# Patient Record
Sex: FEMALE | Race: WHITE
[De-identification: names, ages, dates, MRNs, and addresses within clinical notes are randomized per-mention and may not be internally consistent; named-entity substitution may affect disease eponyms.]

---

## 2017-01-25 ENCOUNTER — HOSPITAL ENCOUNTER (OUTPATIENT)
Dept: HOSPITAL 47 - RADUSWWP | Age: 57
Discharge: HOME | End: 2017-01-25
Payer: COMMERCIAL

## 2017-01-25 DIAGNOSIS — N20.0: Primary | ICD-10-CM

## 2017-01-25 PROCEDURE — 76770 US EXAM ABDO BACK WALL COMP: CPT

## 2017-01-25 NOTE — US
EXAMINATION TYPE: US kidneys/renal and bladder

 

DATE OF EXAM: 1/25/2017 12:43 PM

 

COMPARISON: CT on PACS

 

CLINICAL HISTORY: R31.9 Hematuria.

 

EXAM MEASUREMENTS:

 

Right Kidney:     10.0 x 5.1 x 3.9 cm

Left Kidney: 10.0 x 5.0 x 4.6 cm

Post Void Residual Volume:  9.0 mL

 

Findings:

Right Kidney: no hydro or masses seen  

Left Kidney: multiple small hyperechoic foci with largest shadowing noted mid pole = 0.3 x 0.3 x 0.4c
m.  

Bladder: wnl

**Bilateral Jets seen: yes

**Normal Post Void Residual: yes

 

 

 

 

IMPRESSION:

Nonobstructing left-sided nephrolithiasis.

## 2017-04-08 ENCOUNTER — HOSPITAL ENCOUNTER (OUTPATIENT)
Dept: HOSPITAL 47 - RADMRIMAIN | Age: 57
Discharge: HOME | End: 2017-04-08
Payer: MEDICARE

## 2017-04-08 DIAGNOSIS — R90.82: Primary | ICD-10-CM

## 2017-04-08 DIAGNOSIS — R42: ICD-10-CM

## 2017-04-08 PROCEDURE — 70551 MRI BRAIN STEM W/O DYE: CPT

## 2017-04-08 NOTE — MR
EXAMINATION TYPE: MR brain wo con

 

DATE OF EXAM: 4/8/2017 2:15 PM

 

COMPARISON: CT 9/4/2014

 

HISTORY: 56-year-old female intermittent lightheadedness

 

TECHNIQUE:  Multiplanar, multisequence images of the brain and brainstem were acquired without IV con
trast. Diffusion weighted imaging is performed. 

 

FINDINGS:  

No evidence for acute infarction, hemorrhage, mass, mass effect, midline shift, herniation, effacemen
t of basal cisterns, or extra-axial fluid collection. 

 

The ventricles and sulci are age-appropriate.

 

Major intracranial flow voids are intact.

 

T2/FLAIR weighted sequences show moderate scattered burden of right white matter change located withi
n the periventricular, subcortical, and deep white matter of both cerebral hemispheres. These number 
approximately 50-60 foci on each side.

 

Midline structures demonstrate normal morphology.  The craniocervical junction is normal. 

 

The visualized sinuses are clear and the globes are intact.

 

 

IMPRESSION:

 

1. No acute intracranial abnormality seen.

2. Moderate scattered burden of T2 bright white matter change. This is nonspecific and probably relat
es to chronic small vessel ischemic disease. Uncontrolled hypertension, vasculitis, and other demyeli
nating processes are also in the differential.

## 2017-06-13 ENCOUNTER — HOSPITAL ENCOUNTER (OUTPATIENT)
Dept: HOSPITAL 47 - SLEEP | Age: 57
End: 2017-06-13
Payer: MEDICARE

## 2017-06-13 DIAGNOSIS — F32.9: ICD-10-CM

## 2017-06-13 DIAGNOSIS — Z79.899: ICD-10-CM

## 2017-06-13 DIAGNOSIS — E03.9: ICD-10-CM

## 2017-06-13 DIAGNOSIS — G62.9: ICD-10-CM

## 2017-06-13 DIAGNOSIS — F41.9: ICD-10-CM

## 2017-06-13 DIAGNOSIS — M79.7: ICD-10-CM

## 2017-06-13 DIAGNOSIS — F51.04: Primary | ICD-10-CM

## 2017-06-13 PROCEDURE — 99211 OFF/OP EST MAY X REQ PHY/QHP: CPT

## 2017-06-13 NOTE — CONS
DATE OF CONSULTATION:  



REASON FOR CONSULTATION: Chronic insomnia. 



57 -year-old female patient suffering from chronic insomnia.  She is 

known to have chronic anxiety/depression/bipolar borderline 

personality along with history of fibromyalgia, peripheral neuropathy 

and chronic pain. The patient is coming in and she is concerned that 

her sleep quality is poor. She is averaging around 5 hours of sleep 

and she would like to typically get around 7 to 8 hours; however, she 

is unable to.  It takes around 50 minutes to fall asleep, and it seems 

that she is waking up in the middle of night and sometimes unable to 

go back to sleep. She also admits to snore and occasionally she stops 

breathing as told by family members. In terms of her chronic insomnia, 

the patient is on the appropriate lifestyle adjustments.  She is not 

drinking coffee. She is not taking any form of caffeinated beverages. 

She has tried Ambien and Lunesta in the past without any benefit. She 

is currently on a combination of clonidine 1 mg at nighttime,  

Trazodone 50 mg at nighttime, Vistaril and melatonin. She is working 

with Dr. Quinones from psychiatry. She is feeling tired and sleepy during 

the day and she has trouble paying attention. No sleepwalking. No 

sleeptalking. No restlessness in the lower extremities. No leg kicks. 

(    ).  



PAST MEDICAL HISTORY:  Anxiety/depression, hypothyroidism, peripheral 

neuropathy, fibromyalgia, chronic back pain, borderline personality 

disorder, chronic insomnia, and hypothyroidism.  



Surgical history is arthroscopy.



ALLERGIES: Not known. 



MEDICATIONS: Include:

1. Klonopin 1 mg at bedtime.

2. Trazodone 30 mg 1 tablet at bedtime.

3. Vistaril 50 mg p.o. daily. 

4. Synthroid 88 mcg p.o. daily. 



SOCIAL HISTORY: Smoker, 1 pack of cigarettes a day. No substance 

abuse.  No alcoholism. No coffee intake. No stimulant intake. No IV 

drugs.  



FAMILY HISTORY: Negative for sleep apnea. 



REVIEW OF SYSTEMS: Twelve-point review of systems was done. Positive 

findings were mentioned above in the history of present illness. She 

looks very cachectic and thin. She takes less than 1000 calories of 

diet on a daily basis.  



BP is 122/55, pulse 60, respirations 14, temperature 98.2, saturation 

95% on room air. Weight is 100, height is 63 inches. Neck size is 11. 

[place] score is 0.  

GENERAL APPEARANCE: Calm, comfortable. 

HEENT: Negative for JVD. There is no goiter, neck mass. 

LUNGS: Clear to auscultation. 

HEART: Sounds are regular rate and rhythm. Normal S1, S2. 

ABDOMEN: Soft, nontender. No organomegaly. 

EXTREMITIES: No edema.  No cyanosis or clubbing. 



IMPRESSIONS:

1. Chronic insomnia. 

2. Suspect obstructive sleep apnea contributing to her insomnia. 

3. Fibromyalgia. 

4. Chronic anxiety and depression. 

5. Borderline personality disorder.  

6. Hypothyroidism. 

7. Peripheral neuropathy. 



PLAN:

1. Continue same medications. 

2. Perform polysomnogram to assess the quality of her sleep, her 

ability to induce and maintain sleep and look for any other causes 

that can interrupt impaired sleep quality.  

3. We will follow and make further recommendations based on results of 

the sleep study.

## 2017-07-21 ENCOUNTER — HOSPITAL ENCOUNTER (OUTPATIENT)
Dept: HOSPITAL 47 - RADXRMAIN | Age: 57
Discharge: HOME | End: 2017-07-21
Payer: MEDICARE

## 2017-07-21 DIAGNOSIS — K59.00: ICD-10-CM

## 2017-07-21 DIAGNOSIS — M25.471: Primary | ICD-10-CM

## 2017-07-21 PROCEDURE — 74022 RADEX COMPL AQT ABD SERIES: CPT

## 2017-07-21 NOTE — XR
EXAMINATION TYPE: XR ankle complete RT

 

DATE OF EXAM: 7/21/2017

 

COMPARISON: NONE

 

HISTORY: Pain and swelling

 

TECHNIQUE: 3 views

 

FINDINGS: Ankle mortise is anatomic. I see no fracture nor dislocation. Joint spaces are fairly sita
l.

 

IMPRESSION: Negative right ankle exam.

## 2017-07-21 NOTE — XR
EXAMINATION TYPE: XR abdomen acute w cxr

 

DATE OF EXAM: 7/21/2017

 

COMPARISON: NONE

 

HISTORY:

 

TECHNIQUE:  Supine, upright, and left side down lateral decubitus views of the abdomen are obtained.

 

FINDINGS:  

 

Heart and mediastinum are normal. Lungs are clear. Bowel gas pattern is normal. There is no sign of i
ntestinal obstruction or pneumoperitoneum. Fecal pattern is normal. There is no evidence of a mass. T
here are clips from cholecystectomy. There are no pathologic calcifications over the kidneys.

IMPRESSION: 

Nonacute abdomen. Normal chest.

## 2017-12-26 ENCOUNTER — HOSPITAL ENCOUNTER (OUTPATIENT)
Dept: HOSPITAL 47 - WWCWWP | Age: 57
Discharge: HOME | End: 2017-12-26
Payer: MEDICARE

## 2017-12-26 DIAGNOSIS — Z53.9: Primary | ICD-10-CM

## 2017-12-26 NOTE — [facility] - HISTORY AND PHYSICAL



DATE OF DICTATION:

12/26/2017



CHIEF COMPLAINT:

The patient is here for her routine gynecologic exam.



HPI:

This is a 57-year-old, G6, P1-0-5-1 with an LMP of 2004.  The patient is without

gynecologic complaints.  She is concerned that she has lost some muscle mass in the

abdomen.  She attributes this to the multiple abortions she had in the past.  She is

otherwise without complaints.  She denies any postmenopausal bleeding.



PAST MEDICAL HISTORY:

Osteoporosis, history of kidney stones, depression, anxiety, fibromyalgia,

hypothyroidism, and neuropathy.



MEDICATIONS:

1. Fosamax 70 mg weekly.

2. Levothyroxine 88 mcg daily.

3. BuSpar 15 mg b.i.d.



ALLERGIES:

No known drug allergies.



PAST SURGICAL:

GYN and family histories are unchanged from the 06/21/2016 H and P.



SOCIAL HISTORY:

She smokes about 12 cigarettes per day and denies alcohol and drug use.  She is single

and has been with her boyfriend since about 2014, but does not live with him and is not

sexually active with him.  She is considered disabled.



REVIEW OF SYSTEMS:

She has lost about 7 pounds over the last year.  She denies respiratory or cardiac

problems GI she has been having some intermittent constipation.



PHYSICAL EXAM:

Blood pressure 138/79, height 5 feet 2 inches,  weight 97 pounds.  BMI 18, temperature

98.2, pulse 69.  This is a well-developed, thin white female who is alert and oriented

x3, in no acute distress.  HEENT is within normal limits.

NECK:  Supple without mass or thyromegaly.  Chest and  LUNGS:  Clear to auscultation.

HEART:  Regular rate and rhythm.  Breasts are without mass or discharge.  Axillary exam

is negative for adenopathy.  Back negative for CVA tenderness.

ABDOMEN:  Soft, nontender, without palpable masses. Pelvic exam external genitalia

reveals an irregular raised lesion of the left vulva on the labia majora measuring

approximately 1.5 x 1.0 cm.  The patient states this has been there a long time, but

would like to have it removed.  There are no other external genitalia lesions.  Cervix

and vagina reveals moderate atrophy without lesions.  There is no evidence of prolapse.

There is no evidence of cystocele and there is minimal bladder and urethral movement

with cough and Valsalva.  The uterus is small and mid to anterior in position and is

nontender.  There are no palpable adnexal masses or tenderness.  Rectovaginal exam is

negative for mass or tenderness and is negative for occult blood extremities nontender.



IMPRESSION:

1. 57-year-old menopausal female with left vulvar lesion, which is slightly irregular,

    measuring approximately 1.5 x 1.0 cm.

2. Otherwise unremarkable gynecologic exam.



PLAN:

1. Pap smear was deferred since she had a normal one last year.

2. Self breast examination was discussed.

3. Mammogram is due and a slip was given the patient for this.

4. Osteoporosis management was discussed.  We have discussed the importance of

    adequate calcium, vitamin D and regular exercise.  She will continue on Fosamax as

    prescribed by her primary care physician.

5. She will return at a later date for removal of the vulvar lesion and this will be

    sent for pathological examination.  She will be scheduled for this appointment.

6. We have discussed the importance of quitting smoking.  We discussed possible

    complications with smoking, including increased risk for COPD, lung cancer,

    worsening of osteoporosis as well as wasting away of muscles.

7. She will return in one year.





MMODL / IJN: [id number] / Job#: [id number]

## 2018-01-03 ENCOUNTER — HOSPITAL ENCOUNTER (OUTPATIENT)
Dept: HOSPITAL 47 - WWCWWP | Age: 58
End: 2018-01-03
Payer: MEDICARE

## 2018-01-03 DIAGNOSIS — A63.0: Primary | ICD-10-CM

## 2018-01-03 PROCEDURE — 88305 TISSUE EXAM BY PATHOLOGIST: CPT

## 2018-01-03 PROCEDURE — 56605 BIOPSY OF VULVA/PERINEUM: CPT

## 2018-01-03 NOTE — P.PCN
Date of Procedure: 01/03/18


Preoperative Diagnosis: 


Left vulvar skin lesion


Postoperative Diagnosis: 


Same


Procedure(s) Performed: 


Excision of left vulvar skin lesion


Anesthesia: local


Surgeon: Leander Galindo


Estimated Blood Loss (ml): 1


Pathology: other (Left vulvar skin lesion)


Condition: stable


Disposition: same day


Indications for Procedure: 


This was a 57 year old female with an LMP of 2004.  The patient was found to 

have a left labia majora skin lesion which she states has grown larger during 

the past year.


Operative Findings: 


The lesion on the left labia majora measured 1.6x1.0 cm. It appeared brown, 

raised and irregular.


Description of Procedure: 


The procedure and possible risks and complications were discussed with the 

patient.  The patient was placed in the lithotomy position. Betadine was used 

to prep the area.  The lesion on the left labia majora measured 1.6x1.0 cm. 

Approximately 3cc of Lidocaine 1% was used for local anesthesia. Following 

determination of adequate anesthesia, the lesion was excised with a scalpel. A 

silver nitrate stick was used to stop small bleeding areas. 3-0 Vicryl suture 

was used to close the defect. Two interrupted sutures were place. The site was 

hemostatic. Antibiotic ointment and dressing were applied. The patient 

tolerated the procedure well. The EBL was 1cc. There were no complications. The 

lesion was sent for pathologic examination.





The patient was instructed to remove the dressing in 24 hours, then apply and 

an antibiotic ointment two times daily. She was instructed to call if problems 

and to return in 2 weeks for a recheck and suture removal.

## 2018-06-11 ENCOUNTER — HOSPITAL ENCOUNTER (OUTPATIENT)
Dept: HOSPITAL 47 - LABWHC1 | Age: 58
Discharge: HOME | End: 2018-06-11
Payer: MEDICARE

## 2018-06-11 DIAGNOSIS — R31.9: ICD-10-CM

## 2018-06-11 DIAGNOSIS — M79.89: Primary | ICD-10-CM

## 2018-06-11 DIAGNOSIS — E03.9: ICD-10-CM

## 2018-06-11 LAB
ALBUMIN SERPL-MCNC: 4.4 G/DL (ref 3.5–5)
ALP SERPL-CCNC: 50 U/L (ref 38–126)
ALT SERPL-CCNC: 28 U/L (ref 9–52)
ANION GAP SERPL CALC-SCNC: 9 MMOL/L
AST SERPL-CCNC: 25 U/L (ref 14–36)
BASOPHILS # BLD AUTO: 0 K/UL (ref 0–0.2)
BASOPHILS NFR BLD AUTO: 0 %
BUN SERPL-SCNC: 12 MG/DL (ref 7–17)
CALCIUM SPEC-MCNC: 9.6 MG/DL (ref 8.4–10.2)
CHLORIDE SERPL-SCNC: 99 MMOL/L (ref 98–107)
CK SERPL-CCNC: 44 U/L (ref 30–135)
CO2 SERPL-SCNC: 33 MMOL/L (ref 22–30)
EOSINOPHIL # BLD AUTO: 0.1 K/UL (ref 0–0.7)
EOSINOPHIL NFR BLD AUTO: 3 %
ERYTHROCYTE [DISTWIDTH] IN BLOOD BY AUTOMATED COUNT: 4.55 M/UL (ref 3.8–5.4)
ERYTHROCYTE [DISTWIDTH] IN BLOOD: 12.7 % (ref 11.5–15.5)
GLUCOSE SERPL-MCNC: 92 MG/DL (ref 74–99)
HCT VFR BLD AUTO: 44 % (ref 34–46)
HGB BLD-MCNC: 14.9 GM/DL (ref 11.4–16)
LYMPHOCYTES # SPEC AUTO: 1.4 K/UL (ref 1–4.8)
LYMPHOCYTES NFR SPEC AUTO: 29 %
MCH RBC QN AUTO: 32.6 PG (ref 25–35)
MCHC RBC AUTO-ENTMCNC: 33.7 G/DL (ref 31–37)
MCV RBC AUTO: 96.7 FL (ref 80–100)
MONOCYTES # BLD AUTO: 0.3 K/UL (ref 0–1)
MONOCYTES NFR BLD AUTO: 6 %
NEUTROPHILS # BLD AUTO: 2.9 K/UL (ref 1.3–7.7)
NEUTROPHILS NFR BLD AUTO: 61 %
PLATELET # BLD AUTO: 192 K/UL (ref 150–450)
POTASSIUM SERPL-SCNC: 4.6 MMOL/L (ref 3.5–5.1)
PROT SERPL-MCNC: 6.8 G/DL (ref 6.3–8.2)
SODIUM SERPL-SCNC: 141 MMOL/L (ref 137–145)
T4 FREE SERPL-MCNC: 1.23 NG/DL (ref 0.78–2.19)
WBC # BLD AUTO: 4.7 K/UL (ref 3.8–10.6)

## 2018-06-11 PROCEDURE — 36415 COLL VENOUS BLD VENIPUNCTURE: CPT

## 2018-06-11 PROCEDURE — 82607 VITAMIN B-12: CPT

## 2018-06-11 PROCEDURE — 82746 ASSAY OF FOLIC ACID SERUM: CPT

## 2018-06-11 PROCEDURE — 84443 ASSAY THYROID STIM HORMONE: CPT

## 2018-06-11 PROCEDURE — 85025 COMPLETE CBC W/AUTO DIFF WBC: CPT

## 2018-06-11 PROCEDURE — 84439 ASSAY OF FREE THYROXINE: CPT

## 2018-06-11 PROCEDURE — 82550 ASSAY OF CK (CPK): CPT

## 2018-06-11 PROCEDURE — 80053 COMPREHEN METABOLIC PANEL: CPT

## 2018-06-11 PROCEDURE — 82306 VITAMIN D 25 HYDROXY: CPT

## 2018-06-12 LAB — VIT B12 SERPL-MCNC: 528 PG/ML (ref 200–944)

## 2018-07-16 ENCOUNTER — HOSPITAL ENCOUNTER (OUTPATIENT)
Dept: HOSPITAL 47 - EC | Age: 58
Setting detail: OBSERVATION
LOS: 1 days | Discharge: HOME | End: 2018-07-17
Attending: HOSPITALIST | Admitting: HOSPITALIST
Payer: MEDICARE

## 2018-07-16 VITALS — BODY MASS INDEX: 17.7 KG/M2

## 2018-07-16 DIAGNOSIS — E03.9: ICD-10-CM

## 2018-07-16 DIAGNOSIS — F31.9: ICD-10-CM

## 2018-07-16 DIAGNOSIS — Z91.5: ICD-10-CM

## 2018-07-16 DIAGNOSIS — F60.9: ICD-10-CM

## 2018-07-16 DIAGNOSIS — Z90.49: ICD-10-CM

## 2018-07-16 DIAGNOSIS — Z79.890: ICD-10-CM

## 2018-07-16 DIAGNOSIS — M62.50: ICD-10-CM

## 2018-07-16 DIAGNOSIS — F41.0: ICD-10-CM

## 2018-07-16 DIAGNOSIS — Z80.1: ICD-10-CM

## 2018-07-16 DIAGNOSIS — J40: ICD-10-CM

## 2018-07-16 DIAGNOSIS — Z79.899: ICD-10-CM

## 2018-07-16 DIAGNOSIS — F17.200: ICD-10-CM

## 2018-07-16 DIAGNOSIS — T48.1X2A: Primary | ICD-10-CM

## 2018-07-16 DIAGNOSIS — M79.7: ICD-10-CM

## 2018-07-16 DIAGNOSIS — F41.9: ICD-10-CM

## 2018-07-16 LAB
ALBUMIN SERPL-MCNC: 3.9 G/DL (ref 3.5–5)
ALP SERPL-CCNC: 47 U/L (ref 38–126)
ALT SERPL-CCNC: 31 U/L (ref 9–52)
ANION GAP SERPL CALC-SCNC: 3 MMOL/L
APAP SPEC-MCNC: <10 UG/ML
AST SERPL-CCNC: 30 U/L (ref 14–36)
BASOPHILS # BLD AUTO: 0 K/UL (ref 0–0.2)
BASOPHILS NFR BLD AUTO: 1 %
BUN SERPL-SCNC: 5 MG/DL (ref 7–17)
CALCIUM SPEC-MCNC: 9.3 MG/DL (ref 8.4–10.2)
CHLORIDE SERPL-SCNC: 106 MMOL/L (ref 98–107)
CO2 SERPL-SCNC: 32 MMOL/L (ref 22–30)
EOSINOPHIL # BLD AUTO: 0.1 K/UL (ref 0–0.7)
EOSINOPHIL NFR BLD AUTO: 3 %
ERYTHROCYTE [DISTWIDTH] IN BLOOD BY AUTOMATED COUNT: 4.36 M/UL (ref 3.8–5.4)
ERYTHROCYTE [DISTWIDTH] IN BLOOD: 12.4 % (ref 11.5–15.5)
GLUCOSE SERPL-MCNC: 87 MG/DL (ref 74–99)
HCT VFR BLD AUTO: 41.2 % (ref 34–46)
HGB BLD-MCNC: 13.9 GM/DL (ref 11.4–16)
LYMPHOCYTES # SPEC AUTO: 1.6 K/UL (ref 1–4.8)
LYMPHOCYTES NFR SPEC AUTO: 45 %
MCH RBC QN AUTO: 32 PG (ref 25–35)
MCHC RBC AUTO-ENTMCNC: 33.8 G/DL (ref 31–37)
MCV RBC AUTO: 94.6 FL (ref 80–100)
MONOCYTES # BLD AUTO: 0.3 K/UL (ref 0–1)
MONOCYTES NFR BLD AUTO: 8 %
NEUTROPHILS # BLD AUTO: 1.5 K/UL (ref 1.3–7.7)
NEUTROPHILS NFR BLD AUTO: 42 %
PH UR: 6 [PH] (ref 5–8)
PLATELET # BLD AUTO: 182 K/UL (ref 150–450)
POTASSIUM SERPL-SCNC: 4.6 MMOL/L (ref 3.5–5.1)
PROT SERPL-MCNC: 6.2 G/DL (ref 6.3–8.2)
RBC UR QL: <1 /HPF (ref 0–5)
SALICYLATES SERPL-MCNC: <1 MG/DL
SODIUM SERPL-SCNC: 141 MMOL/L (ref 137–145)
SP GR UR: 1 (ref 1–1.03)
UROBILINOGEN UR QL STRIP: <2 MG/DL (ref ?–2)
WBC # BLD AUTO: 3.7 K/UL (ref 3.8–10.6)

## 2018-07-16 PROCEDURE — 85025 COMPLETE CBC W/AUTO DIFF WBC: CPT

## 2018-07-16 PROCEDURE — 81001 URINALYSIS AUTO W/SCOPE: CPT

## 2018-07-16 PROCEDURE — 83520 IMMUNOASSAY QUANT NOS NONAB: CPT

## 2018-07-16 PROCEDURE — 80053 COMPREHEN METABOLIC PANEL: CPT

## 2018-07-16 PROCEDURE — 82075 ASSAY OF BREATH ETHANOL: CPT

## 2018-07-16 PROCEDURE — 80306 DRUG TEST PRSMV INSTRMNT: CPT

## 2018-07-16 PROCEDURE — 99285 EMERGENCY DEPT VISIT HI MDM: CPT

## 2018-07-16 RX ADMIN — CEFAZOLIN SCH MLS/HR: 330 INJECTION, POWDER, FOR SOLUTION INTRAMUSCULAR; INTRAVENOUS at 23:34

## 2018-07-16 NOTE — ED
Psych HPI





- General


Source: patient, police, EMS, RN notes reviewed


Mode of arrival: EMS


Limitations: no limitations





<Herber Diop - Last Filed: 07/16/18 18:14>





<Landon Bates - Last Filed: 07/16/18 22:09>





- General


Chief Complaint: Psychiatric Symptoms


Stated Complaint: EPS eval


Time Seen by Provider: 07/16/18 17:09





- History of Present Illness


Initial Comments: 





This a 58-year-old female presents emergency Department with police for 

psychiatric evaluation.  Patient reportedly was threatening to harm herself 

with a knife.  She states that she was a cut her wrist.  She is denying this at 

this time stating that she was just upset that she's had her back weekend.  

Patient reportedly took 3 Flexeril but states that she was not trying to harm 

herself.  Patient also states that she drank a few beers today.  Patient denies 

any homicidal ideation denies any drug use.  Patient does have a history of 

psychiatric disorder is currently being seen at [facility].  She denies chest pain, 

shortness breath, headache, dizziness, nausea or vomiting. (Herber Diop)





- Related Data


 Home Medications











 Medication  Instructions  Recorded  Confirmed


 


DULoxetine HCL [Cymbalta] 60 mg PO BID 07/16/18 07/16/18


 


Levothyroxine Sodium [Synthroid] 100 mcg PO DAILY 07/16/18 07/16/18


 


clonazePAM [KlonoPIN] 1 mg PO HS 07/16/18 07/16/18


 


diphenhydrAMINE [Benadryl] 50 mg PO BID 07/16/18 07/16/18


 


traZODone HCL 50 mg PO HS 07/16/18 07/16/18








 Previous Rx's











 Medication  Instructions  Recorded


 


busPIRone HCl [Buspar] 30 mg PO BID #60 tab 09/15/14


 


traZODone  mg PO HS 5 Days  tab 11/25/16











 Allergies











Allergy/AdvReac Type Severity Reaction Status Date / Time


 


No Known Allergies Allergy   Unverified 07/16/18 17:35














Review of Systems


ROS Other: All systems not noted in ROS Statement are negative.





<Herber Diop - Last Filed: 07/16/18 18:14>


ROS Other: All systems not noted in ROS Statement are negative.





<Landon Bates - Last Filed: 07/16/18 22:09>


ROS Statement: 


Those systems with pertinent positive or pertinent negative responses have been 

documented in the HPI.








Past Medical History


Past Medical History: Fibromyalgia, Thyroid Disorder


Additional Past Medical History / Comment(s): muscle wasting


History of Any Multi-Drug Resistant Organisms: None Reported


Past Surgical History: Orthopedic Surgery


Additional Past Surgical History / Comment(s): Surgey on bilateral knees in 

2006. Torn cartilage. Gallstones surgically removed.  carpal tunnel right hand 

surgery


Past Anesthesia/Blood Transfusion Reactions: No Reported Reaction


Additional Past Anesthesia/Blood Transfusion Reaction / Comment(s): Patient 

denies.


Past Psychological History: Anxiety, Depression, Panic Disorder


Smoking Status: Heavy tobacco smoker


Past Alcohol Use History: Occasional


Past Drug Use History: None Reported





- Past Family History


  ** Mother


Family Medical History: Cancer





  ** Father


Family Medical History: Cancer


Additional Family Medical History / Comment(s): Mother and father passed away 

from lung cancer.





<Herber Diop - Last Filed: 07/16/18 18:14>





General Exam


General appearance: alert, in no apparent distress, anxious


Head exam: Present: atraumatic, normocephalic, normal inspection


Eye exam: Present: normal appearance, PERRL, EOMI.  Absent: scleral icterus, 

conjunctival injection, periorbital swelling


ENT exam: Present: normal exam, normal oropharynx, mucous membranes moist, TM's 

normal bilaterally


Neck exam: Present: normal inspection.  Absent: tenderness, meningismus, 

lymphadenopathy


Respiratory exam: Present: normal lung sounds bilaterally.  Absent: respiratory 

distress, wheezes, rales, rhonchi, stridor


Cardiovascular Exam: Present: regular rate, normal rhythm, normal heart sounds.

  Absent: systolic murmur, diastolic murmur, rubs, gallop, clicks


Neurological exam: Present: alert, oriented X3, CN II-XII intact


Psychiatric exam: Present: agitated, anxious


Skin exam: Present: warm, dry, intact, normal color.  Absent: rash





<Herber Diop - Last Filed: 07/16/18 18:14>





Course





<Herber Diop - Last Filed: 07/16/18 18:14>





<Landon Bates - Last Filed: 07/16/18 22:09>





 Vital Signs











  07/16/18





  17:14


 


Temperature 100.0 F H


 


Pulse Rate 81


 


Respiratory 20





Rate 


 


Blood Pressure 123/70


 


O2 Sat by Pulse 97





Oximetry 














- Reevaluation(s)


Reevaluation #1: 





07/16/18 22:08


Patient was earlier seen by mental health services however was too drowsy to be 

evaluated.  Patient reevaluated by myself, Dr. Bates.  Patient remains drowsy.  

Patient is easily arousable.  Patient does not fully oriented.  No meningismus.

  Dr. Gudino has been paged for hospital admission. (Landon Bates)





- Lab Data





 Lab Results











  07/16/18 Range/Units





  17:16 


 


Urine Color  Colorless  


 


Urine Appearance  Clear  (Clear)  


 


Urine pH  6.0  (5.0-8.0)  


 


Ur Specific Gravity  1.002  (1.001-1.035)  


 


Urine Protein  Negative  (Negative)  


 


Urine Glucose (UA)  Negative  (Negative)  


 


Urine Ketones  Negative  (Negative)  


 


Urine Blood  Small H  (Negative)  


 


Urine Nitrite  Negative  (Negative)  


 


Urine Bilirubin  Negative  (Negative)  


 


Urine Urobilinogen  <2.0  (<2.0)  mg/dL


 


Ur Leukocyte Esterase  Negative  (Negative)  


 


Urine RBC  <1  (0-5)  /hpf


 


Calcium Oxalate Crystal  Rare H  (None)  /hpf


 


Urine Mucus  Rare H  (None)  /hpf


 


Urine Opiates Screen  Not Detected  (NotDetected)  


 


Ur Oxycodone Screen  Not Detected  (NotDetected)  


 


Urine Methadone Screen  Not Detected  (NotDetected)  


 


Ur Propoxyphene Screen  Not Detected  (NotDetected)  


 


Ur Barbiturates Screen  Not Detected  (NotDetected)  


 


U Tricyclic Antidepress  Not Detected  (NotDetected)  


 


Ur Phencyclidine Scrn  Not Detected  (NotDetected)  


 


Ur Amphetamines Screen  Not Detected  (NotDetected)  


 


U Methamphetamines Scrn  Not Detected  (NotDetected)  


 


U Benzodiazepines Scrn  Not Detected  (NotDetected)  


 


Urine Cocaine Screen  Not Detected  (NotDetected)  


 


U Marijuana (THC) Screen  Not Detected  (NotDetected)  














Disposition





<Herber Diop - Last Filed: 07/16/18 18:14>


Is patient prescribed a controlled substance at d/c from ED?: No


Decision Time: 22:09





<Landon Bates - Last Filed: 07/16/18 22:09>


Clinical Impression: 


 Suicide attempt, Drug overdose





Disposition: ADMITTED AS IP TO THIS HOSP


Referrals: 


None,Stated [Primary Care Provider] - 1-2 days

## 2018-07-17 VITALS
RESPIRATION RATE: 16 BRPM | SYSTOLIC BLOOD PRESSURE: 133 MMHG | HEART RATE: 70 BPM | DIASTOLIC BLOOD PRESSURE: 77 MMHG | TEMPERATURE: 97.2 F

## 2018-07-17 RX ADMIN — CEFAZOLIN SCH MLS/HR: 330 INJECTION, POWDER, FOR SOLUTION INTRAMUSCULAR; INTRAVENOUS at 12:54
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
## 2018-07-17 NOTE — P.HPIM
History of Present Illness


58-year-old female came to ER for psychiatric evaluation apparently took about 

4 pills of Flexeril because of his the itch patient was admitted to medicine 

patient the common is metabolic profile essentially within normal limits 

patient is awake and alert.  Patient apparently was threatening to harm herself 

with knife.  But patient denied any suicidal or homicidal ideations to me 

patient is appropriate.  Patient was evaluated by psychiatric related who 

cleared her for discharge patient will be discharged today.  Patient denied any 

suicidal ideations.  Patient evidently had history of bipolar disorder and 

multiple psychiatric hospitalizations patient is not willing to voluntarily get 

admitted to psychiatric floor.  Patient states she took Flexeril to go to sleep 

was not trying to hurt herself or hurt anyone.








Review of Systems


REVIEW OF SYSTEMS: 


CONSTITUTIONAL: No fever, no malaise, no fatigue. 


HEENT: No recent visual problems or hearing problems. Denied any sore throat. 


CARDIOVASCULAR: No chest pain, orthopnea, PND, no palpitations, no syncope. 


PULMONARY: No shortness of breath, no cough, no hemoptysis. 


GASTROINTESTINAL: No diarrhea, no nausea, no vomiting, no abdominal pain. 

Normoactive bowel sounds. 


NEUROLOGICAL: No headaches, no weakness, no numbness. 


HEMATOLOGICAL: Denies any bleeding or petechiae. 


GENITOURINARY: Denies any burning micturition, frequency, or urgency. 


MUSCULOSKELETAL/RHEUMATOLOGICAL: Denies any joint pain, swelling, or any muscle 

pain. 


ENDOCRINE: Denies any polyuria or polydipsia. 





The rest of the 14-point review of systems is negative.











Past Medical History


Past Medical History: Fibromyalgia, Thyroid Disorder


Additional Past Medical History / Comment(s): muscle wasting


History of Any Multi-Drug Resistant Organisms: None Reported


Past Surgical History: Orthopedic Surgery


Additional Past Surgical History / Comment(s): Surgey on bilateral knees in 

2006. Torn cartilage. Gallstones surgically removed.  carpal tunnel right hand 

surgery


Past Anesthesia/Blood Transfusion Reactions: No Reported Reaction


Additional Past Anesthesia/Blood Transfusion Reaction / Comment(s): Patient 

denies.


Past Psychological History: Anxiety, Depression, Panic Disorder


Additional Psychological History / Comment(s): Bordeline Personality Disorder, 

Body Dysmorphia. Patient reports she is open with [facility]. Dr. Mccarthy is her 

psychiatrist and Micaela Ayala is her therapist. Patient has history of 2 [facility] 

addmission. Most recent was at [facility] for suicide attempt.


Smoking Status: Heavy tobacco smoker


Past Alcohol Use History: Occasional


Additional Past Alcohol Use History / Comment(s): pt denies alcohol use.


Past Drug Use History: Marijuana





- Past Family History


  ** Mother


Family Medical History: Cancer





  ** Father


Family Medical History: Cancer


Additional Family Medical History / Comment(s): Mother and father passed away 

from lung cancer.





Medications and Allergies


 Home Medications











 Medication  Instructions  Recorded  Confirmed  Type


 


busPIRone HCl [Buspar] 30 mg PO BID #60 tab 09/15/14 07/16/18 Rx


 


traZODone  mg PO HS 5 Days  tab 11/25/16 07/16/18 Rx


 


DULoxetine HCL [Cymbalta] 60 mg PO BID 07/16/18 07/16/18 History


 


Levothyroxine Sodium [Synthroid] 100 mcg PO DAILY 07/16/18 07/16/18 History


 


clonazePAM [KlonoPIN] 1 mg PO HS 07/16/18 07/16/18 History


 


diphenhydrAMINE [Benadryl] 50 mg PO BID 07/16/18 07/16/18 History


 


traZODone HCL 50 mg PO HS 07/16/18 07/16/18 History











 Allergies











Allergy/AdvReac Type Severity Reaction Status Date / Time


 


No Known Allergies Allergy   Unverified 07/16/18 17:35














Physical Exam


Vitals: 


 Vital Signs











  Temp Pulse Pulse Resp BP BP Pulse Ox


 


 07/17/18 16:00     16   


 


 07/17/18 14:30  97.2 F L   70  16   133/77  93 L


 


 07/17/18 12:34    58 L  24   


 


 07/17/18 08:00     18   


 


 07/17/18 05:22  97.3 F L   60  19   133/70  93 L


 


 07/16/18 23:49  97.0 F L   62  17   126/81  99


 


 07/16/18 22:00  97.9 F  57 L   16  126/71   96


 


 07/16/18 17:14  100.0 F H  81   20  123/70   97








 Intake and Output











 07/17/18 07/17/18 07/17/18





 06:59 14:59 22:59


 


Intake Total  600 


 


Balance  600 


 


Intake:   


 


  Oral  600 


 


Other:   


 


  # Voids 1 3 


 


  Weight 43.99 kg 43.99 kg 











PHYSICAL EXAMINATION: 





GENERAL: The patient is alert and oriented x3, not in any acute distress. Well 

developed, well nourished. 


HEENT: Pupils are round and equally reacting to light. EOMI. No scleral 

icterus. No conjunctival pallor. Normocephalic, atraumatic. No pharyngeal 

erythema. No thyromegaly. 


CARDIOVASCULAR: S1 and S2 present. No murmurs, rubs, or gallops. 


PULMONARY: Chest is clear to auscultation, no wheezing or crackles. 


ABDOMEN: Soft, nontender, nondistended, normoactive bowel sounds. No palpable 

organomegaly. 


MUSCULOSKELETAL: No joint swelling or deformity.


EXTREMITIES: No cyanosis, clubbing, or pedal edema.  Patient does have couple 

scratches in the left forearm which appeared to be from knife


NEUROLOGICAL: Gross neurological examination did not reveal any focal deficits. 


SKIN: No rashes. 








Results


CBC & Chem 7: 


 07/16/18 23:11





 07/16/18 23:11


Labs: 


 Abnormal Lab Results - Last 24 Hours (Table)











  07/16/18 07/16/18 07/16/18 Range/Units





  17:16 23:11 23:11 


 


WBC    3.7 L  (3.8-10.6)  k/uL


 


Carbon Dioxide   32 H   (22-30)  mmol/L


 


BUN   5 L   (7-17)  mg/dL


 


Total Protein   6.2 L   (6.3-8.2)  g/dL


 


Urine Blood  Small H    (Negative)  


 


Calcium Oxalate Crystal  Rare H    (None)  /hpf


 


Urine Mucus  Rare H    (None)  /hpf














Thrombosis Risk Factor Assmnt





- Choose All That Apply


Any of the Below Risk Factors Present?: Yes


Each Factor Represents 1 point: Age 41-60 years


Other Risk Factors: No


Other congenital or acquired thrombophilia - If yes, enter type in comment: No


Thrombosis Risk Factor Assessment Total Risk Factor Score: 1


Thrombosis Risk Factor Assessment Level: Low Risk





Assessment and Plan


Plan: 


Overdose on Flexeril: Patient is clinically stable at this point of time where 

he'll wake patient is medically stable to be discharged patient will be 

discharged if cleared by psychiatric


-Possibly of suicidal ideations although she denied any suicide attempt to me.  

Patient of discharge depending on psychiatric evaluation.


-Hypothyroidism


-Fibromyalgia.


-Continued smoking with some mild tracheobronchitis and possibility of COPD 

without any significant exacerbation, counseling regarding smoking was provided 

and do not believe patient will require antibiotics.

## 2018-08-10 ENCOUNTER — HOSPITAL ENCOUNTER (EMERGENCY)
Dept: HOSPITAL 47 - EC | Age: 58
Discharge: HOME | End: 2018-08-10
Payer: MEDICARE

## 2018-08-10 VITALS — DIASTOLIC BLOOD PRESSURE: 51 MMHG | SYSTOLIC BLOOD PRESSURE: 117 MMHG | TEMPERATURE: 98.5 F | HEART RATE: 58 BPM

## 2018-08-10 VITALS — RESPIRATION RATE: 18 BRPM

## 2018-08-10 DIAGNOSIS — F10.129: ICD-10-CM

## 2018-08-10 DIAGNOSIS — F32.9: ICD-10-CM

## 2018-08-10 DIAGNOSIS — T42.4X1A: Primary | ICD-10-CM

## 2018-08-10 DIAGNOSIS — F17.200: ICD-10-CM

## 2018-08-10 DIAGNOSIS — Z79.899: ICD-10-CM

## 2018-08-10 DIAGNOSIS — E07.9: ICD-10-CM

## 2018-08-10 DIAGNOSIS — F41.0: ICD-10-CM

## 2018-08-10 LAB
ALBUMIN SERPL-MCNC: 4.4 G/DL (ref 3.5–5)
ALP SERPL-CCNC: 47 U/L (ref 38–126)
ALT SERPL-CCNC: 21 U/L (ref 9–52)
ANION GAP SERPL CALC-SCNC: 13 MMOL/L
APAP SPEC-MCNC: <10 UG/ML
AST SERPL-CCNC: 25 U/L (ref 14–36)
BASOPHILS # BLD AUTO: 0 K/UL (ref 0–0.2)
BASOPHILS NFR BLD AUTO: 0 %
BUN SERPL-SCNC: 11 MG/DL (ref 7–17)
CALCIUM SPEC-MCNC: 9.6 MG/DL (ref 8.4–10.2)
CHLORIDE SERPL-SCNC: 104 MMOL/L (ref 98–107)
CO2 SERPL-SCNC: 23 MMOL/L (ref 22–30)
EOSINOPHIL # BLD AUTO: 0.1 K/UL (ref 0–0.7)
EOSINOPHIL NFR BLD AUTO: 1 %
ERYTHROCYTE [DISTWIDTH] IN BLOOD BY AUTOMATED COUNT: 4.43 M/UL (ref 3.8–5.4)
ERYTHROCYTE [DISTWIDTH] IN BLOOD: 12.6 % (ref 11.5–15.5)
GLUCOSE SERPL-MCNC: 84 MG/DL (ref 74–99)
HCT VFR BLD AUTO: 42 % (ref 34–46)
HGB BLD-MCNC: 13.9 GM/DL (ref 11.4–16)
LYMPHOCYTES # SPEC AUTO: 2.2 K/UL (ref 1–4.8)
LYMPHOCYTES NFR SPEC AUTO: 37 %
MCH RBC QN AUTO: 31.4 PG (ref 25–35)
MCHC RBC AUTO-ENTMCNC: 33.1 G/DL (ref 31–37)
MCV RBC AUTO: 95 FL (ref 80–100)
MONOCYTES # BLD AUTO: 0.3 K/UL (ref 0–1)
MONOCYTES NFR BLD AUTO: 6 %
NEUTROPHILS # BLD AUTO: 3.4 K/UL (ref 1.3–7.7)
NEUTROPHILS NFR BLD AUTO: 55 %
PLATELET # BLD AUTO: 232 K/UL (ref 150–450)
POTASSIUM SERPL-SCNC: 3.7 MMOL/L (ref 3.5–5.1)
PROT SERPL-MCNC: 6.8 G/DL (ref 6.3–8.2)
SALICYLATES SERPL-MCNC: <1 MG/DL
SODIUM SERPL-SCNC: 140 MMOL/L (ref 137–145)
WBC # BLD AUTO: 6.1 K/UL (ref 3.8–10.6)

## 2018-08-10 PROCEDURE — 82075 ASSAY OF BREATH ETHANOL: CPT

## 2018-08-10 PROCEDURE — 81025 URINE PREGNANCY TEST: CPT

## 2018-08-10 PROCEDURE — 96360 HYDRATION IV INFUSION INIT: CPT

## 2018-08-10 PROCEDURE — 85025 COMPLETE CBC W/AUTO DIFF WBC: CPT

## 2018-08-10 PROCEDURE — 80320 DRUG SCREEN QUANTALCOHOLS: CPT

## 2018-08-10 PROCEDURE — 99285 EMERGENCY DEPT VISIT HI MDM: CPT

## 2018-08-10 PROCEDURE — 80053 COMPREHEN METABOLIC PANEL: CPT

## 2018-08-10 PROCEDURE — 83520 IMMUNOASSAY QUANT NOS NONAB: CPT

## 2018-08-10 PROCEDURE — 93005 ELECTROCARDIOGRAM TRACING: CPT

## 2018-08-10 PROCEDURE — 36415 COLL VENOUS BLD VENIPUNCTURE: CPT

## 2018-08-10 PROCEDURE — 80306 DRUG TEST PRSMV INSTRMNT: CPT

## 2018-08-10 NOTE — ED
General Adult HPI





- General


Source: patient, EMS, RN notes reviewed


Mode of arrival: EMS


Limitations: no limitations





<Ji Wilkinson - Last Filed: 08/10/18 16:51>





<Femi Morgan - Last Filed: 08/10/18 19:21>





- General


Chief complaint: Psychiatric Symptoms


Stated complaint: Overdose


Time Seen by Provider: 08/10/18 13:15





- History of Present Illness


Initial comments: 





This is a 58-year-old who presents to the emergency department after having 

overdosed on 8 BuSpar and 6 Klonopin as well as drink alcohol.  Patient states 

she suicidal and she took the pills try to kill himself.  Patient denies any 

other legal or illegal drugs.  Patient was not very cooperative would not give 

me any history at this time other than this a physically she has no problems. (

Ji Wilkinson)





- Related Data


 Home Medications











 Medication  Instructions  Recorded  Confirmed


 


Levothyroxine Sodium [Synthroid] 100 mcg PO DAILY 07/16/18 08/10/18


 


clonazePAM [KlonoPIN] 1 mg PO HS 07/16/18 08/10/18


 


diphenhydrAMINE [Benadryl] 50 mg PO BID 07/16/18 08/10/18


 


traZODone HCL 50 mg PO HS 07/16/18 08/10/18


 


FLUoxetine HCL [PROzac] 20 mg PO DAILY 08/10/18 08/10/18








 Previous Rx's











 Medication  Instructions  Recorded


 


busPIRone HCl [Buspar] 30 mg PO BID #60 tab 09/15/14


 


traZODone  mg PO HS 5 Days  tab 11/25/16











 Allergies











Allergy/AdvReac Type Severity Reaction Status Date / Time


 


No Known Allergies Allergy   Verified 08/10/18 13:22














Review of Systems


ROS Other: All systems not noted in ROS Statement are negative.





<Ji Wilkinson - Last Filed: 08/10/18 16:51>


ROS Other: All systems not noted in ROS Statement are negative.





<Femi Morgan - Last Filed: 08/10/18 19:21>


ROS Statement: 


Those systems with pertinent positive or pertinent negative responses have been 

documented in the HPI.








Past Medical History


Past Medical History: Fibromyalgia, Thyroid Disorder


Additional Past Medical History / Comment(s): muscle wasting


History of Any Multi-Drug Resistant Organisms: None Reported


Past Surgical History: Orthopedic Surgery


Additional Past Surgical History / Comment(s): Surgey on bilateral knees in 

2006. Torn cartilage. Gallstones surgically removed.  carpal tunnel right hand 

surgery


Past Anesthesia/Blood Transfusion Reactions: No Reported Reaction


Additional Past Anesthesia/Blood Transfusion Reaction / Comment(s): Patient 

denies.


Past Psychological History: Anxiety, Depression, Panic Disorder


Smoking Status: Heavy tobacco smoker


Past Alcohol Use History: Occasional


Past Drug Use History: Marijuana





- Past Family History


  ** Mother


Family Medical History: Cancer





  ** Father


Family Medical History: Cancer


Additional Family Medical History / Comment(s): Mother and father passed away 

from lung cancer.





<Ji Wilkinson - Last Filed: 08/10/18 16:51>





General Exam


Limitations: no limitations





<Ji Wilkinson - Last Filed: 08/10/18 16:51>





<Femi Morgan - Last Filed: 08/10/18 19:21>





- General Exam Comments


Initial Comments: 





GENERAL:


Patient is well-developed and well-nourished.  Patient is nontoxic and well-

hydrated and is in no acute distress.  Patient appears intoxicated





ENT:


Neck is soft and supple.  No significant lymphadenopathy is noted.  Oropharynx 

is clear.  Moist mucous membranes.  Neck has full range of motion without 

eliciting any pain.  





EYES:


The sclera were anicteric and conjunctiva were pink and moist.  Extraocular 

movements were intact and pupils were equal round and reactive to light.  

Eyelids were unremarkable.





PULMONARY:


Unlabored respirations.  Good breath sounds bilaterally.  No audible rales 

rhonchi or wheezing was noted.





CARDIOVASCULAR:


There is a regular rate and rhythm without any murmurs gallops or rubs.  





ABDOMEN:


Soft and nontender with normal bowel sounds.  





SKIN:


Skin is clear with no lesions or rashes and otherwise unremarkable.





NEUROLOGIC:


Patient is alert and oriented x3.  Cranial nerves II through XII are grossly 

intact.  Motor and sensory are also intact.  Normal speech, volume and content.

  Symmetrical smile.  





MUSCULOSKELETAL:


Normal extremities with adequate strength and full range of motion.  





LYMPHATICS:


No significant lymphadenopathy is noted





PSYCHIATRIC:


Patient states she suicidal and she is very agitated and aggressive. (Ji Wilkinson)





 Vital Signs











  08/10/18 08/10/18 08/10/18





  13:16 13:43 16:52


 


Temperature 98 F  


 


Pulse Rate 79 77 66


 


Respiratory 18 18 18





Rate   


 


Blood Pressure 122/61 107/55 122/58


 


O2 Sat by Pulse 95 96 98





Oximetry   














Procedures





- Restraint - Face to Face


  ** Restraint Occurrence 1


Patient's Immediate Situation: Endangers others' safety


Patient's Reaction to the Intervention: Uncooperative, Belligerent


Patient's Medical & Behavioral Condition: Awake, Alert


Need to Continue or Terminate Restraint or Seclusion: Continue


Face to Face Eval of Restraint Date: 08/10/18


Face to Face Eval of Restraint Time: 13:34





<Ji Wilkinson - Last Filed: 08/10/18 16:51>





Medical Decision Making





- Lab Data


Result diagrams: 


 08/10/18 13:26





 08/10/18 13:26





<Ji Wilkinson - Last Filed: 08/10/18 16:51>





- Lab Data


Result diagrams: 


 08/10/18 13:26





 08/10/18 13:26





<Femi Morgan - Last Filed: 08/10/18 19:21>





- Medical Decision Making





EKG shows normal sinus rhythm at 82 bpm CA interval 268 QRSs 84 Q-T intervals 

398 QTC is 464.  Patient's EKG shows no ST segment elevation or depression or T 

wave abnormalities are noted.





Dr. Morgan will be taking over the care of this patient at 5 PM (Ji Wilkinson)





Patient's care is signed out at shift change awaiting sobriety and EPS 

evaluation.  Patient is sober on reevaluation.  She is cleared medically.  She 

is alert and oriented, denies suicidal or homicidal ideation.  She is evaluated 

by EPS, no need for inpatient treatment at this time.  She will be discharged 

home with outpatient [facility] follow-up. (Femi Morgan)





- Lab Data





 Lab Results











  08/10/18 08/10/18 08/10/18 Range/Units





  13:26 13:26 14:06 


 


WBC   6.1   (3.8-10.6)  k/uL


 


RBC   4.43   (3.80-5.40)  m/uL


 


Hgb   13.9   (11.4-16.0)  gm/dL


 


Hct   42.0   (34.0-46.0)  %


 


MCV   95.0   (80.0-100.0)  fL


 


MCH   31.4   (25.0-35.0)  pg


 


MCHC   33.1   (31.0-37.0)  g/dL


 


RDW   12.6   (11.5-15.5)  %


 


Plt Count   232   (150-450)  k/uL


 


Neutrophils %   55   %


 


Lymphocytes %   37   %


 


Monocytes %   6   %


 


Eosinophils %   1   %


 


Basophils %   0   %


 


Neutrophils #   3.4   (1.3-7.7)  k/uL


 


Lymphocytes #   2.2   (1.0-4.8)  k/uL


 


Monocytes #   0.3   (0-1.0)  k/uL


 


Eosinophils #   0.1   (0-0.7)  k/uL


 


Basophils #   0.0   (0-0.2)  k/uL


 


Sodium  140    (137-145)  mmol/L


 


Potassium  3.7    (3.5-5.1)  mmol/L


 


Chloride  104    ()  mmol/L


 


Carbon Dioxide  23    (22-30)  mmol/L


 


Anion Gap  13    mmol/L


 


BUN  11    (7-17)  mg/dL


 


Creatinine  0.69    (0.52-1.04)  mg/dL


 


Est GFR (CKD-EPI)AfAm  >90    (>60 ml/min/1.73 sqM)  


 


Est GFR (CKD-EPI)NonAf  >90    (>60 ml/min/1.73 sqM)  


 


Glucose  84    (74-99)  mg/dL


 


Calcium  9.6    (8.4-10.2)  mg/dL


 


Total Bilirubin  0.3    (0.2-1.3)  mg/dL


 


AST  25    (14-36)  U/L


 


ALT  21    (9-52)  U/L


 


Alkaline Phosphatase  47    ()  U/L


 


Total Protein  6.8    (6.3-8.2)  g/dL


 


Albumin  4.4    (3.5-5.0)  g/dL


 


Urine HCG, Qual     (Not Detectd)  


 


Salicylates  <1.0    mg/dL


 


Urine Opiates Screen    Not Detected  (NotDetected)  


 


Ur Oxycodone Screen    Not Detected  (NotDetected)  


 


Urine Methadone Screen    Not Detected  (NotDetected)  


 


Ur Propoxyphene Screen    Not Detected  (NotDetected)  


 


Acetaminophen  <10.0    ug/mL


 


Ur Barbiturates Screen    Not Detected  (NotDetected)  


 


U Tricyclic Antidepress    Not Detected  (NotDetected)  


 


Ur Phencyclidine Scrn    Not Detected  (NotDetected)  


 


Ur Amphetamines Screen    Not Detected  (NotDetected)  


 


U Methamphetamines Scrn    Not Detected  (NotDetected)  


 


U Benzodiazepines Scrn    Not Detected  (NotDetected)  


 


Urine Cocaine Screen    Not Detected  (NotDetected)  


 


U Marijuana (THC) Screen    Detected H  (NotDetected)  


 


Serum Alcohol  99    mg/dL














  08/10/18 Range/Units





  14:06 


 


WBC   (3.8-10.6)  k/uL


 


RBC   (3.80-5.40)  m/uL


 


Hgb   (11.4-16.0)  gm/dL


 


Hct   (34.0-46.0)  %


 


MCV   (80.0-100.0)  fL


 


MCH   (25.0-35.0)  pg


 


MCHC   (31.0-37.0)  g/dL


 


RDW   (11.5-15.5)  %


 


Plt Count   (150-450)  k/uL


 


Neutrophils %   %


 


Lymphocytes %   %


 


Monocytes %   %


 


Eosinophils %   %


 


Basophils %   %


 


Neutrophils #   (1.3-7.7)  k/uL


 


Lymphocytes #   (1.0-4.8)  k/uL


 


Monocytes #   (0-1.0)  k/uL


 


Eosinophils #   (0-0.7)  k/uL


 


Basophils #   (0-0.2)  k/uL


 


Sodium   (137-145)  mmol/L


 


Potassium   (3.5-5.1)  mmol/L


 


Chloride   ()  mmol/L


 


Carbon Dioxide   (22-30)  mmol/L


 


Anion Gap   mmol/L


 


BUN   (7-17)  mg/dL


 


Creatinine   (0.52-1.04)  mg/dL


 


Est GFR (CKD-EPI)AfAm   (>60 ml/min/1.73 sqM)  


 


Est GFR (CKD-EPI)NonAf   (>60 ml/min/1.73 sqM)  


 


Glucose   (74-99)  mg/dL


 


Calcium   (8.4-10.2)  mg/dL


 


Total Bilirubin   (0.2-1.3)  mg/dL


 


AST   (14-36)  U/L


 


ALT   (9-52)  U/L


 


Alkaline Phosphatase   ()  U/L


 


Total Protein   (6.3-8.2)  g/dL


 


Albumin   (3.5-5.0)  g/dL


 


Urine HCG, Qual  Not Detected  (Not Detectd)  


 


Salicylates   mg/dL


 


Urine Opiates Screen   (NotDetected)  


 


Ur Oxycodone Screen   (NotDetected)  


 


Urine Methadone Screen   (NotDetected)  


 


Ur Propoxyphene Screen   (NotDetected)  


 


Acetaminophen   ug/mL


 


Ur Barbiturates Screen   (NotDetected)  


 


U Tricyclic Antidepress   (NotDetected)  


 


Ur Phencyclidine Scrn   (NotDetected)  


 


Ur Amphetamines Screen   (NotDetected)  


 


U Methamphetamines Scrn   (NotDetected)  


 


U Benzodiazepines Scrn   (NotDetected)  


 


Urine Cocaine Screen   (NotDetected)  


 


U Marijuana (THC) Screen   (NotDetected)  


 


Serum Alcohol   mg/dL














Disposition





<Ji Wilkinson - Last Filed: 08/10/18 16:51>


Is patient prescribed a controlled substance at d/c from ED?: No


Time of Disposition: 19:20





<Femi Morgan - Last Filed: 08/10/18 19:21>


Clinical Impression: 


 Drug overdose, Depression, Alcohol abuse





Disposition: HOME SELF-CARE


Condition: Fair


Instructions:  Abuse of Alcohol (ED), Depression (ED)


Additional Instructions: 


Please follow-up with [facility] mental [facility] and her primary care physician.  

Return with worsening or changing symptoms.


Referrals: 


None,Stated [REFERRING] - 1-2 days

## 2018-08-21 ENCOUNTER — HOSPITAL ENCOUNTER (OUTPATIENT)
Dept: HOSPITAL 47 - RADCTMAIN | Age: 58
Discharge: HOME | End: 2018-08-21
Attending: UROLOGY
Payer: MEDICARE

## 2018-08-21 DIAGNOSIS — N20.0: Primary | ICD-10-CM

## 2018-08-21 PROCEDURE — 74400 UROGRAPHY IV +-KUB TOMOG: CPT

## 2018-08-21 PROCEDURE — 36415 COLL VENOUS BLD VENIPUNCTURE: CPT

## 2018-08-21 PROCEDURE — 74178 CT ABD&PLV WO CNTR FLWD CNTR: CPT

## 2018-08-21 NOTE — CT
EXAMINATION TYPE: CT urogram wo/w con

 

DATE OF EXAM: 8/21/2018

 

HISTORY: Hematuria

 

CT DLP: 896mGycm  Automated Exposure Control for Dose Reduction was Utilized.

 

CONTRAST: 

CT scan of the abdomen and pelvis is performed without oral and without and with IV Contrast, patient
 injected with 100 ml mL of Isovue 300. Urogram protocol with Three-D reconstructed images created on
 independent workstation and reviewed.

 

COMPARISON: Prior CT abdomen and pelvis study February 13, 2016.

 

FINDINGS:

 

KUB: Noncontrast images show single 3 mm calculus centrally mid pole level left kidney coronal image 
61 on noncontrast study slightly larger in size versus prior study. There is no right-sided nephrolit
hiasis. There is symmetric cortical medullary uptake and excretion from both kidneys without evidence
 of concerning solid or cystic renal mass or hydronephrosis identified bilaterally. Visualized portio
n of both ureters show satisfactory opacity without suspicious dilatation or calcification. Bladder i
s satisfactorily distended without worrisome intraluminal mass or wall thickening.

 

LUNG BASES: There is minimal central left basilar linear scarring redemonstrated.

 

LIVER/GB: Cholecystectomy clips are redemonstrated. Liver is felt mildly enlarged particularly left h
epatic lobe without significant change from prior study.

 

PANCREAS: No significant abnormality is seen.

 

SPLEEN: No significant abnormality is seen.

 

ADRENALS: No significant abnormality is seen.

 

KIDNEYS: No significant abnormality is seen.

 

BOWEL: Patient has very little intra-abdominal fat making evaluation of bowel is suboptimal. Evaluati
on also suboptimal due to lack of enteric contrast. No suspicious dilatation is present. There are di
verticula seen in the somewhat redundant sigmoid colon of the pelvis. No convincing evidence for acut
e diverticulitis.

 

UTERUS/ADNEXA: Anteverted uterus is noted.

 

LYMPH NODES: No greater than 1cm abdominal or pelvic lymph nodes are appreciated.

 

OSSEOUS STRUCTURES: Mild multilevel anterior spurring in the visualized spine facet arthropathy lower
 lumbar levels is present.

 

OTHER: There is mild to moderate calcified plaque of aorta extending into branch vessels.

 

IMPRESSION: There is slightly larger nonobstructing 3 mm calculus mid pole level left kidney. No worr
isome mass identified.

## 2018-09-07 ENCOUNTER — HOSPITAL ENCOUNTER (OUTPATIENT)
Dept: HOSPITAL 47 - RADMRIMAIN | Age: 58
Discharge: HOME | End: 2018-09-07
Attending: PSYCHIATRY & NEUROLOGY
Payer: MEDICARE

## 2018-09-07 DIAGNOSIS — R90.89: Primary | ICD-10-CM

## 2018-09-07 DIAGNOSIS — R25.1: ICD-10-CM

## 2018-09-07 PROCEDURE — 70551 MRI BRAIN STEM W/O DYE: CPT

## 2018-09-07 NOTE — MR
EXAMINATION TYPE: MR brain wo con

 

DATE OF EXAM: 9/7/2018

 

COMPARISON: Prior MRI brain April 8, 2017

 

HISTORY: Tremors of nervous system per order. Possible stroke with dizziness or hearing loss per rubens
ent.

 

TECHNIQUE: Multiplanar, multisequence imaging of the brain and brainstem is performed without IV cont
rast.

 

FINDINGS:  

Diffusion weighted images demonstrate no evidence of a recent infarct or other diffusion abnormality.


 

There is no worrisome extra-axial fluid collection.  The ventricular system and cisternal spaces are 
normal in size and appearance.  The brain volume is age appropriate. There is redemonstration of scat
tered foci T2 hyperintensity seen throughout the superficial, deep, and periventricular white matter.
 Roughly 50-80 scattered lesions are redemonstrated without significant interval change from prior st
udy. No definitive infratentorial lesions are seen.

 

Midline structures demonstrate normal morphology.  The craniocervical junction appears within normal 
limits. Normal vascular flow voids are present. The visualized sinuses are clear and the globes are i
ntact.

 

IMPRESSION: Moderate to severe nonspecific white matter changes redemonstrated without significant in
terval change. Findings could be product of chronic small vessel ischemic change. Other etiologies ar
e not excluded.

## 2018-10-25 ENCOUNTER — HOSPITAL ENCOUNTER (OUTPATIENT)
Dept: HOSPITAL 47 - RADUSWWP | Age: 58
Discharge: HOME | End: 2018-10-25
Attending: PSYCHIATRY & NEUROLOGY
Payer: MEDICARE

## 2018-10-25 ENCOUNTER — HOSPITAL ENCOUNTER (EMERGENCY)
Dept: HOSPITAL 47 - EC | Age: 58
LOS: 1 days | Discharge: HOME | End: 2018-10-26
Payer: MEDICARE

## 2018-10-25 DIAGNOSIS — F17.200: ICD-10-CM

## 2018-10-25 DIAGNOSIS — F41.9: Primary | ICD-10-CM

## 2018-10-25 DIAGNOSIS — R25.1: Primary | ICD-10-CM

## 2018-10-25 DIAGNOSIS — E07.9: ICD-10-CM

## 2018-10-25 DIAGNOSIS — Z79.899: ICD-10-CM

## 2018-10-25 DIAGNOSIS — M79.7: ICD-10-CM

## 2018-10-25 DIAGNOSIS — R42: ICD-10-CM

## 2018-10-25 DIAGNOSIS — G47.00: ICD-10-CM

## 2018-10-25 DIAGNOSIS — F32.9: ICD-10-CM

## 2018-10-25 PROCEDURE — 99283 EMERGENCY DEPT VISIT LOW MDM: CPT

## 2018-10-25 PROCEDURE — 93880 EXTRACRANIAL BILAT STUDY: CPT

## 2018-10-25 PROCEDURE — 82075 ASSAY OF BREATH ETHANOL: CPT

## 2018-10-25 PROCEDURE — 80306 DRUG TEST PRSMV INSTRMNT: CPT

## 2018-10-25 NOTE — ED
Psych HPI





- General


Chief Complaint: Psychiatric Symptoms


Stated Complaint: panic attack


Time Seen by Provider: 10/25/18 22:04


Source: patient


Mode of arrival: ambulatory





- History of Present Illness


Initial Comments: 





This patient is a 58-year-old woman presenting with concerns about anxiety and 

insomnia.  The patient states that she had recently been changed from Klonopin, 

1 mg, at bedtime to taking clonidine.  She states that since that time she is 

having difficulty getting to sleep.  She feels very anxious, and she also feels 

somewhat shaky.  The patient states that she attempted to reach out to the 

crisis line and also to [facility], and she was referred to the 

emergency department to be seen.  The patient is concerned that if she is left 

alone tonight she doesn't know what she will do.


MD Complaint: other


-: days(s)


Associated Psychiatric Symptoms: racing thoughts


History of same: Yes


Quality: getting worse


Improves With: none


Worsens With: none


Context: new medication(s)


Associated Symptoms: insomnia





- Related Data


 Home Medications











 Medication  Instructions  Recorded  Confirmed


 


diphenhydrAMINE [Benadryl] 50 mg PO BID 07/16/18 10/25/18


 


FLUoxetine HCL [PROzac] 20 mg PO DAILY 08/10/18 10/25/18


 


Levothyroxine Sodium [Synthroid] 88 mcg PO DAILY 10/25/18 10/25/18


 


cloNIDine HCL [Catapres] 0.2 mg PO BID 10/25/18 10/25/18


 


clonazePAM [KlonoPIN] 0.5 mg PO HS 10/25/18 10/25/18


 


traZODone  mg PO HS 10/25/18 10/25/18








 Previous Rx's











 Medication  Instructions  Recorded


 


busPIRone HCl [Buspar] 30 mg PO BID #60 tab 09/15/14











 Allergies











Allergy/AdvReac Type Severity Reaction Status Date / Time


 


No Known Allergies Allergy   Verified 10/25/18 21:50














Review of Systems


ROS Statement: 


Those systems with pertinent positive or pertinent negative responses have been 

documented in the HPI.





ROS Other: All systems not noted in ROS Statement are negative.


Constitutional: Denies: fever, chills


Eyes: Denies: vision change


Respiratory: Denies: cough, dyspnea


Cardiovascular: Denies: chest pain, palpitations, edema


Gastrointestinal: Denies: abdominal pain, vomiting, diarrhea


Neurological: Denies: headache, weakness, numbness, paresthesias


Psychiatric: Reports: anxiety.  Denies: depression, auditory hallucinations, 

visual hallucinations, homicidal thoughts, suicidal thoughts





Past Medical History


Past Medical History: Fibromyalgia, Thyroid Disorder


Additional Past Medical History / Comment(s): muscle wasting


History of Any Multi-Drug Resistant Organisms: None Reported


Past Surgical History: Orthopedic Surgery


Additional Past Surgical History / Comment(s): Surgey on bilateral knees in 

2006. Torn cartilage. Gallstones surgically removed.  carpal tunnel right hand 

surgery


Past Anesthesia/Blood Transfusion Reactions: No Reported Reaction


Additional Past Anesthesia/Blood Transfusion Reaction / Comment(s): Patient 

denies.


Past Psychological History: Anxiety, Depression, Panic Disorder


Smoking Status: Heavy tobacco smoker


Past Alcohol Use History: Occasional


Past Drug Use History: Marijuana





- Past Family History


  ** Mother


Family Medical History: Cancer





  ** Father


Family Medical History: Cancer


Additional Family Medical History / Comment(s): Mother and father passed away 

from lung cancer.





General Exam


Limitations: no limitations


General appearance: alert, in no apparent distress


Head exam: Present: atraumatic, normocephalic


Eye exam: Present: normal appearance.  Absent: scleral icterus, conjunctival 

injection


ENT exam: Present: normal oropharynx


Neck exam: Present: normal inspection


Respiratory exam: Present: normal lung sounds bilaterally.  Absent: respiratory 

distress, wheezes, rales, rhonchi, stridor


Cardiovascular Exam: Present: regular rate, normal rhythm, normal heart sounds.

  Absent: systolic murmur, diastolic murmur, rubs, gallop


GI/Abdominal exam: Present: soft.  Absent: distended, tenderness, guarding, 

rebound, rigid


Back exam: Present: normal inspection.  Absent: CVA tenderness (R), CVA 

tenderness (L)


Neurological exam: Present: alert, oriented X3.  Absent: motor sensory deficit


Psychiatric exam: Present: anxious.  Absent: depressed, agitated, flat affect, 

manic, homicidal ideation, suicidal ideation


Skin exam: Present: warm, dry, intact, normal color.  Absent: rash





Course


 Vital Signs











  10/25/18





  20:33


 


Temperature 98.2 F


 


Pulse Rate 62


 


Respiratory 20





Rate 


 


Blood Pressure 147/60


 


O2 Sat by Pulse 98





Oximetry 














Medical Decision Making





- Lab Data


 Lab Results











  10/25/18 Range/Units





  22:45 


 


Urine Opiates Screen  Not Detected  (NotDetected)  


 


Ur Oxycodone Screen  Not Detected  (NotDetected)  


 


Urine Methadone Screen  Not Detected  (NotDetected)  


 


Ur Propoxyphene Screen  Not Detected  (NotDetected)  


 


Ur Barbiturates Screen  Not Detected  (NotDetected)  


 


U Tricyclic Antidepress  Not Detected  (NotDetected)  


 


Ur Phencyclidine Scrn  Not Detected  (NotDetected)  


 


Ur Amphetamines Screen  Not Detected  (NotDetected)  


 


U Methamphetamines Scrn  Not Detected  (NotDetected)  


 


U Benzodiazepines Scrn  Not Detected  (NotDetected)  


 


Urine Cocaine Screen  Not Detected  (NotDetected)  


 


U Marijuana (THC) Screen  Not Detected  (NotDetected)  














Disposition


Clinical Impression: 


 Anxiety





Disposition: HOME SELF-CARE


Condition: Fair


Instructions:  Anxiety (ED)


Is patient prescribed a controlled substance at d/c from ED?: No


Referrals: 


Inge Harris MD [Primary Care Provider] - 1-2 days

## 2018-10-26 VITALS
RESPIRATION RATE: 16 BRPM | TEMPERATURE: 98 F | HEART RATE: 68 BPM | DIASTOLIC BLOOD PRESSURE: 75 MMHG | SYSTOLIC BLOOD PRESSURE: 146 MMHG

## 2018-10-26 NOTE — US
EXAMINATION TYPE: US carotid duplex BILAT

 

DATE OF EXAM: 10/25/2018

 

COMPARISON: NONE

 

CLINICAL HISTORY: R42 Dizziness and giddiness. Pt states tremors 

 

EXAM MEASUREMENTS: 

 

RIGHT:  Peak Systolic Velocity (PSV) cm/sec

----- Right CCA:  64.6  

----- Right ICA:  87.0     

----- Right ECA:  57.3   

ICA/CCA ratio:  1.3    

 

RIGHT:  End Diastole cm/sec

----- Right CCA:  28.9   

----- Right ICA:  27.6      

----- Right ECA:  12.7     

 

LEFT:  Peak Systolic Velocity (PSV) cm/sec

----- Left CCA:  80.0  

----- Left ICA:  74.5   

----- Left ECA:  73.0  

ICA/CCA ratio:  0.9  

 

LEFT:  End Diastole cm/sec

----- Left CCA:  27.1  

----- Left ICA:  31.5   

----- Left ECA:  19.7 

 

VERTEBRALS (direction of flow):

Right Vertebral: Antegrade

Left Vertebral: Antegrade

 

Rhythm:  Normal

 

No significant stenosis seen

 

 

 

IMPRESSION:  No evidence for hemodynamically significant stenosis.   

 

 

Criteria for Assigning % of Stenosis / Diameter reduction

(Estimation based on the indirect measurements of the internal carotid artery velocities (ICA PSV).

1.  Normal (no stenosis)=ICA PSV < 125 cm/s: ratio < 2.0: ICA EDV<40 cm/s.

2. Less than 50% stenosis=ICA PSV < 125 cm/s: ratio < 2.0: ICA EDV<40 cm/s.

3.  50 to 69% stenosis=ICA PSV of 125 to 230 cm/s: ration 2.0 ? 4.0: ICA EDV  cm/s.

4.  Greater than 70% stenosis to near occlusion= ICA PSV > 230 cm/s: ratio > 4.0: ICA EDV > 100 cm/s.
 

5.  Near occlusion= ICA PSV velocities may be low or undetectable: variable ratio and ICA EDV.

6.  Total occlusion=unable to detect flow.

## 2018-12-05 ENCOUNTER — HOSPITAL ENCOUNTER (EMERGENCY)
Dept: HOSPITAL 47 - EC | Age: 58
Discharge: HOME | End: 2018-12-05
Payer: MEDICARE

## 2018-12-05 VITALS — TEMPERATURE: 97 F | HEART RATE: 80 BPM | SYSTOLIC BLOOD PRESSURE: 121 MMHG | DIASTOLIC BLOOD PRESSURE: 64 MMHG

## 2018-12-05 VITALS — RESPIRATION RATE: 18 BRPM

## 2018-12-05 DIAGNOSIS — F17.200: ICD-10-CM

## 2018-12-05 DIAGNOSIS — E07.9: ICD-10-CM

## 2018-12-05 DIAGNOSIS — M79.7: ICD-10-CM

## 2018-12-05 DIAGNOSIS — F41.0: ICD-10-CM

## 2018-12-05 DIAGNOSIS — M79.89: ICD-10-CM

## 2018-12-05 DIAGNOSIS — Z79.899: ICD-10-CM

## 2018-12-05 DIAGNOSIS — M25.571: Primary | ICD-10-CM

## 2018-12-05 DIAGNOSIS — F32.9: ICD-10-CM

## 2018-12-05 PROCEDURE — 73610 X-RAY EXAM OF ANKLE: CPT

## 2018-12-05 PROCEDURE — 99283 EMERGENCY DEPT VISIT LOW MDM: CPT

## 2018-12-05 PROCEDURE — 73630 X-RAY EXAM OF FOOT: CPT

## 2018-12-05 NOTE — XR
EXAMINATION TYPE: XR foot complete RT

 

DATE OF EXAM: 12/5/2018

 

COMPARISON: NONE

 

HISTORY: Pain

 

TECHNIQUE: 3 views

 

FINDINGS: Metatarsals are intact. I see no fracture nor dislocation. There are no erosions.

 

IMPRESSION: Negative right foot exam.

## 2018-12-05 NOTE — ED
General Adult HPI





- General


Chief complaint: Extremity Injury, Lower


Stated complaint: Foot/ankle injury


Time Seen by Provider: 12/05/18 20:36


Source: patient


Mode of arrival: wheelchair


Limitations: physical limitation





- Related Data


 Home Medications











 Medication  Instructions  Recorded  Confirmed


 


diphenhydrAMINE [Benadryl] 50 mg PO BID 07/16/18 10/25/18


 


FLUoxetine HCL [PROzac] 20 mg PO DAILY 08/10/18 10/25/18


 


Levothyroxine Sodium [Synthroid] 88 mcg PO DAILY 10/25/18 10/25/18


 


cloNIDine HCL [Catapres] 0.2 mg PO BID 10/25/18 10/25/18


 


clonazePAM [KlonoPIN] 0.5 mg PO HS 10/25/18 10/25/18


 


traZODone  mg PO HS 10/25/18 10/25/18








 Previous Rx's











 Medication  Instructions  Recorded


 


busPIRone HCl [Buspar] 30 mg PO BID #60 tab 09/15/14











 Allergies











Allergy/AdvReac Type Severity Reaction Status Date / Time


 


No Known Allergies Allergy   Verified 12/05/18 20:33














Review of Systems


ROS Statement: 


Those systems with pertinent positive or pertinent negative responses have been 

documented in the HPI.





ROS Other: All systems not noted in ROS Statement are negative.





Past Medical History


Past Medical History: Fibromyalgia, Neurologic Disorder, Thyroid Disorder


Additional Past Medical History / Comment(s): muscle wasting, neuropathy


History of Any Multi-Drug Resistant Organisms: None Reported


Past Surgical History: Orthopedic Surgery


Additional Past Surgical History / Comment(s): Surgey on bilateral knees in 

2006. Torn cartilage. Gallstones surgically removed.  carpal tunnel right hand 

surgery


Past Anesthesia/Blood Transfusion Reactions: No Reported Reaction


Additional Past Anesthesia/Blood Transfusion Reaction / Comment(s): Patient 

denies.


Past Psychological History: Anxiety, Depression, Panic Disorder


Smoking Status: Heavy tobacco smoker


Past Alcohol Use History: Occasional


Past Drug Use History: Marijuana





- Past Family History


  ** Mother


Family Medical History: Cancer





  ** Father


Family Medical History: Cancer


Additional Family Medical History / Comment(s): Mother and father passed away 

from lung cancer.





General Exam


Limitations: physical limitation





Course





 Vital Signs











  12/05/18





  20:29


 


Temperature 98.5 F


 


Pulse Rate 73


 


Respiratory 18





Rate 


 


Blood Pressure 124/79


 


O2 Sat by Pulse 96





Oximetry 














Disposition


Referrals: 


Inge Harris MD [Primary Care Provider] - 1-2 days

## 2018-12-05 NOTE — XR
EXAMINATION TYPE: XR ankle complete RT

 

DATE OF EXAM: 12/5/2018

 

COMPARISON: 7/21/2017

 

HISTORY: Pain

 

TECHNIQUE: 3 views

 

FINDINGS: There is soft tissue swelling over the lateral malleolus. I see no fracture nor dislocation
. Ankle mortise is anatomic.

 

IMPRESSION: Soft tissue swelling. No fracture seen. Soft tissue swelling is new compared to old exam.

## 2018-12-05 NOTE — ED
General Adult HPI





- General


Chief complaint: Extremity Injury, Lower


Stated complaint: Foot/ankle injury


Time Seen by Provider: 12/05/18 20:36


Source: patient


Mode of arrival: wheelchair


Limitations: physical limitation





- History of Present Illness


Initial comments: 





58-year-old female with a past medical history of fibromyalgia, thyroid 

disorder presents to the emergency department for a chief complaint of right 

foot and ankle pain 4 hours.  Patient states she was walking down a sidewalk 

path earlier today when she actually stepped off a 6 inch step off.  Patient 

states she has been unable to comfortably bear weight on the extremity since 

that time.  She denies previous surgery on this ankle.  Patient has not taken 

anything yet for pain.  She states she cannot take Motrin.Patient has no other 

complaints at this time including shortness of breath, chest pain, abdominal 

pain, nausea or vomiting, headache, or visual changes.





- Related Data


 Home Medications











 Medication  Instructions  Recorded  Confirmed


 


diphenhydrAMINE [Benadryl] 50 mg PO BID 07/16/18 10/25/18


 


FLUoxetine HCL [PROzac] 20 mg PO DAILY 08/10/18 10/25/18


 


Levothyroxine Sodium [Synthroid] 88 mcg PO DAILY 10/25/18 10/25/18


 


cloNIDine HCL [Catapres] 0.2 mg PO BID 10/25/18 10/25/18


 


clonazePAM [KlonoPIN] 0.5 mg PO HS 10/25/18 10/25/18


 


traZODone  mg PO HS 10/25/18 10/25/18








 Previous Rx's











 Medication  Instructions  Recorded


 


busPIRone HCl [Buspar] 30 mg PO BID #60 tab 09/15/14











 Allergies











Allergy/AdvReac Type Severity Reaction Status Date / Time


 


No Known Allergies Allergy   Verified 12/05/18 20:33














Review of Systems


ROS Statement: 


Those systems with pertinent positive or pertinent negative responses have been 

documented in the HPI.





ROS Other: All systems not noted in ROS Statement are negative.





Past Medical History


Past Medical History: Fibromyalgia, Neurologic Disorder, Thyroid Disorder


Additional Past Medical History / Comment(s): muscle wasting, neuropathy


History of Any Multi-Drug Resistant Organisms: None Reported


Past Surgical History: Orthopedic Surgery


Additional Past Surgical History / Comment(s): Surgey on bilateral knees in 

2006. Torn cartilage. Gallstones surgically removed.  carpal tunnel right hand 

surgery


Past Anesthesia/Blood Transfusion Reactions: No Reported Reaction


Additional Past Anesthesia/Blood Transfusion Reaction / Comment(s): Patient 

denies.


Past Psychological History: Anxiety, Depression, Panic Disorder


Smoking Status: Heavy tobacco smoker


Past Alcohol Use History: Occasional


Past Drug Use History: Marijuana





- Past Family History


  ** Mother


Family Medical History: Cancer





  ** Father


Family Medical History: Cancer


Additional Family Medical History / Comment(s): Mother and father passed away 

from lung cancer.





General Exam


Limitations: physical limitation


General appearance: alert, in no apparent distress


Head exam: Present: atraumatic, normocephalic, normal inspection


Eye exam: Present: normal appearance, PERRL, EOMI.  Absent: scleral icterus, 

conjunctival injection, periorbital swelling


ENT exam: Present: normal exam, mucous membranes moist


Neck exam: Present: normal inspection, full ROM.  Absent: tenderness, 

meningismus, lymphadenopathy


Respiratory exam: Present: normal lung sounds bilaterally.  Absent: respiratory 

distress, wheezes, rales, rhonchi, stridor


Cardiovascular Exam: Present: regular rate, normal rhythm, normal heart sounds.

  Absent: systolic murmur, diastolic murmur, rubs, gallop, clicks


GI/Abdominal exam: Present: normal bowel sounds


Extremities exam: Present: tenderness (Tenderness noted to the arch of the 

right foot as well as the right lateral malleolus), normal capillary refill (

Capillary refill less than 2 seconds and DP pulse 2+), joint swelling (Patient 

has moderate edema noted to the right lateral malleolus with contusion.  No 

edema or contusion noted to the bottom of the foot.), other (Sensation intact 

in the right lower extremity, ).  Absent: full ROM (10 dorsiflexion, 20 

plantar flexion of the right ankle)


Neurological exam: Present: alert, oriented X3, CN II-XII intact


Psychiatric exam: Present: normal affect, normal mood





Course


 Vital Signs











  12/05/18





  20:29


 


Temperature 98.5 F


 


Pulse Rate 73


 


Respiratory 18





Rate 


 


Blood Pressure 124/79


 


O2 Sat by Pulse 96





Oximetry 














Medical Decision Making





- Medical Decision Making





58-year-old female presents to the emergency department for a chief complaint 

of right ankle pain.  Patient accidentally stepped off a 6 inch ledge.  On exam 

patient has tenderness on the sole of her right foot as well as tenderness to 

the right lateral malleolus.  There is moderate swelling noted to the right 

lateral malleolus, no swelling of the sole of the foot.  Patient is able to 

ambulate although it is somewhat painful.  Neurovascular intact in the right 

lower extremity.  X-ray of the right ankle shows soft tissue swelling without 

fracture.  X-ray of the right foot is negative for fracture.  Recommended OCL 

splint but patient states she would rather have air splint.  Patient was given 

air splint and crutches.  Told to be nonweightbearing until she follows up with 

orthopedics.  Educated on rice therapy and Motrin and Tylenol for pain.  She 

will return here if she has any worsening symptoms.





Disposition


Clinical Impression: 


 Ankle pain, right, Foot pain, right





Disposition: HOME SELF-CARE


Condition: Good


Instructions:  Ankle Sprain (ED), Foot Sprain (ED)


Additional Instructions: 


Please rest ice and elevate the right ankle.  Please take Motrin and Tylenol 

for pain.  Please follow-up with orthopedics in one to 2 days.  Return to the 

emergency department if you have any worsening symptoms.


Is patient prescribed a controlled substance at d/c from ED?: No


Referrals: 


Inge Harris MD [Primary Care Provider] - 1-2 days


Michael Campuzano MD [STAFF PHYSICIAN] - 1-2 days


Time of Disposition: 22:33

## 2019-02-04 ENCOUNTER — HOSPITAL ENCOUNTER (EMERGENCY)
Dept: HOSPITAL 47 - EC | Age: 59
LOS: 1 days | Discharge: HOME | End: 2019-02-05
Payer: MEDICARE

## 2019-02-04 VITALS — HEART RATE: 64 BPM

## 2019-02-04 VITALS — RESPIRATION RATE: 20 BRPM | TEMPERATURE: 99.2 F

## 2019-02-04 DIAGNOSIS — Z79.890: ICD-10-CM

## 2019-02-04 DIAGNOSIS — R03.0: ICD-10-CM

## 2019-02-04 DIAGNOSIS — Z79.899: ICD-10-CM

## 2019-02-04 DIAGNOSIS — F17.200: ICD-10-CM

## 2019-02-04 DIAGNOSIS — E07.9: ICD-10-CM

## 2019-02-04 DIAGNOSIS — M79.7: ICD-10-CM

## 2019-02-04 DIAGNOSIS — F32.9: ICD-10-CM

## 2019-02-04 DIAGNOSIS — G62.9: ICD-10-CM

## 2019-02-04 DIAGNOSIS — F41.9: Primary | ICD-10-CM

## 2019-02-04 PROCEDURE — 84484 ASSAY OF TROPONIN QUANT: CPT

## 2019-02-04 PROCEDURE — 80048 BASIC METABOLIC PNL TOTAL CA: CPT

## 2019-02-04 PROCEDURE — 99283 EMERGENCY DEPT VISIT LOW MDM: CPT

## 2019-02-04 PROCEDURE — 85025 COMPLETE CBC W/AUTO DIFF WBC: CPT

## 2019-02-04 PROCEDURE — 36415 COLL VENOUS BLD VENIPUNCTURE: CPT

## 2019-02-04 NOTE — ED
Anxiety HPI





- General


Chief Complaint: Anxiety


Stated Complaint: Shaky, sweaty


Time Seen by Provider: 19 21:17


Source: patient, family


Mode of arrival: ambulatory





- History of Present Illness


Initial Comments: 





This patient is a 58-year-old woman who complains of feeling anxious and shaky 

going back for about a week.  The patient states that she had noted this when 

she went to see her psychiatrist last time which was last week.  Since that 

time she has not been feeling like her usual self.  She has been anxious and 

feeling as if she has a little bit of shaking.  She was coming here to be seen 

about that and then noticed that her blood pressure was high in triage and she 

states she is feeling more anxious.  The patient has further noted that the 

symptoms seem to be a little worse after she takes her inhaler.  She has not 

noticed improving symptoms.  She denies chest pain.


MD Complaint: anxiety, other (Feeling shaky)


Onset/Timin


-: week(s)


Place: home


Severity: moderate


Quality: constant


Improves With: nothing


Worsens With: medication





- Related Data


Home Medications: 


 Home Medications











 Medication  Instructions  Recorded  Confirmed


 


Levothyroxine Sodium [Synthroid] 88 mcg PO DAILY 10/25/18 02/04/19


 


traZODone  mg PO HS 10/25/18 02/04/19


 


ARIPiprazole [Abilify] 5 mg PO DAILY 19


 


ARIPiprazole [Abilify] 10 mg PO HS 19


 


Alendronate Sodium [Fosamax] 35 mg PO Q7D 19


 


Gabapentin [Neurontin] 100 mg PO BID 19


 


Gabapentin [Neurontin] 300 mg PO HS 19


 


Multivitamins, Thera [Multivitamin 1 tab PO DAILY 19





(formulary)]   


 


Vortioxetine Hydrobromide 10 mg PO DAILY 19





[Trintellix]   


 


busPIRone HCL 15 mg PO BID 19


 


hydrOXYzine PAMOATE [Vistaril] 25 mg PO BID PRN 19











Allergies/Adverse Reactions: 


 Allergies











Allergy/AdvReac Type Severity Reaction Status Date / Time


 


No Known Allergies Allergy   Verified 19 21:32














Review of Systems


ROS Statement: 


Those systems with pertinent positive or pertinent negative responses have been 

documented in the HPI.





ROS Other: All systems not noted in ROS Statement are negative.


Constitutional: Denies: fever, chills


Eyes: Denies: vision change


Respiratory: Denies: cough, dyspnea


Cardiovascular: Denies: chest pain, palpitations, orthopnea, syncope


Gastrointestinal: Denies: abdominal pain, vomiting, diarrhea


Genitourinary: Denies: dysuria


Musculoskeletal: Denies: back pain


Skin: Denies: rash


Neurological: Denies: headache, weakness, numbness, confusion


Psychiatric: Reports: anxiety.  Denies: suicidal thoughts





Past Medical History


Past Medical History: Fibromyalgia, Neurologic Disorder, Thyroid Disorder


Additional Past Medical History / Comment(s): muscle wasting, neuropathy


History of Any Multi-Drug Resistant Organisms: None Reported


Past Surgical History: Orthopedic Surgery


Additional Past Surgical History / Comment(s): Surgey on bilateral knees in 

. Torn cartilage. Gallstones surgically removed.  carpal tunnel right hand 

surgery


Past Anesthesia/Blood Transfusion Reactions: No Reported Reaction


Additional Past Anesthesia/Blood Transfusion Reaction / Comment(s): Patient 

denies.


Past Psychological History: Anxiety, Depression, Panic Disorder


Smoking Status: Heavy tobacco smoker


Past Alcohol Use History: Occasional


Past Drug Use History: Marijuana





- Past Family History


  ** Mother


Family Medical History: Cancer





  ** Father


Family Medical History: Cancer


Additional Family Medical History / Comment(s): Mother and father passed away 

from lung cancer.





General Exam


Limitations: no limitations


General appearance: alert, in no apparent distress


Head exam: Present: atraumatic, normocephalic


Eye exam: Present: normal appearance.  Absent: scleral icterus, conjunctival 

injection


ENT exam: Present: mucous membranes dry


Neck exam: Present: normal inspection


Respiratory exam: Present: normal lung sounds bilaterally.  Absent: respiratory 

distress, wheezes, rales, rhonchi, stridor


Cardiovascular Exam: Present: regular rate, normal rhythm, normal heart sounds


GI/Abdominal exam: Present: soft.  Absent: tenderness, guarding, rebound


Extremities exam: Present: normal inspection, normal capillary refill.  Absent: 

pedal edema, calf tenderness


Back exam: Present: normal inspection.  Absent: CVA tenderness (R), CVA 

tenderness (L)


Neurological exam: Present: alert, normal gait


Psychiatric exam: Present: anxious.  Absent: depressed, agitated, homicidal 

ideation, suicidal ideation


Skin exam: Present: warm, dry, intact, normal color.  Absent: rash





Course


 Vital Signs











  19





  19:12 22:13 00:29


 


Temperature 99.2 F  


 


Pulse Rate 80 64 


 


Respiratory 20 20 





Rate   


 


Blood Pressure 180/74 142/86 141/78


 


O2 Sat by Pulse 94 L 96 





Oximetry   














Medical Decision Making





- Medical Decision Making





Patient states she was feeling better while waiting and requested to go.  Her 

troponin negative, and given the duration of symptoms second will not be 

required.  Discussed appropriate further care and follow-up related to blood 

pressure and also related to anxiety.





- Lab Data


Result diagrams: 


 19 00:06





 19 00:06


 Lab Results











  19 Range/Units





  22:20 00:06 00:06 


 


WBC    6.6  (3.8-10.6)  k/uL


 


RBC    4.59  (3.80-5.40)  m/uL


 


Hgb    14.6  (11.4-16.0)  gm/dL


 


Hct    44.7  (34.0-46.0)  %


 


MCV    97.5  (80.0-100.0)  fL


 


MCH    31.7  (25.0-35.0)  pg


 


MCHC    32.5  (31.0-37.0)  g/dL


 


RDW    11.9  (11.5-15.5)  %


 


Plt Count    197  (150-450)  k/uL


 


Neutrophils %    64  %


 


Lymphocytes %    27  %


 


Monocytes %    6  %


 


Eosinophils %    1  %


 


Basophils %    1  %


 


Neutrophils #    4.2  (1.3-7.7)  k/uL


 


Lymphocytes #    1.8  (1.0-4.8)  k/uL


 


Monocytes #    0.4  (0-1.0)  k/uL


 


Eosinophils #    0.1  (0-0.7)  k/uL


 


Basophils #    0.0  (0-0.2)  k/uL


 


Sodium   139   (137-145)  mmol/L


 


Potassium   4.3   (3.5-5.1)  mmol/L


 


Chloride   102   ()  mmol/L


 


Carbon Dioxide   30   (22-30)  mmol/L


 


Anion Gap   7   mmol/L


 


BUN   13   (7-17)  mg/dL


 


Creatinine   0.63   (0.52-1.04)  mg/dL


 


Est GFR (CKD-EPI)AfAm   >90   (>60 ml/min/1.73 sqM)  


 


Est GFR (CKD-EPI)NonAf   >90   (>60 ml/min/1.73 sqM)  


 


Glucose   97   (74-99)  mg/dL


 


Calcium   9.3   (8.4-10.2)  mg/dL


 


Troponin I  <0.012    (0.000-0.034)  ng/mL














Disposition


Clinical Impression: 


 Acute anxiety





Disposition: HOME SELF-CARE


Instructions (If sedation given, give patient instructions):  Generalized 

Anxiety Disorder (ED)


Is patient prescribed a controlled substance at d/c from ED?: No


Referrals: 


Inge Harris MD [Primary Care Provider] - 1-2 days

## 2019-02-05 VITALS — DIASTOLIC BLOOD PRESSURE: 78 MMHG | SYSTOLIC BLOOD PRESSURE: 141 MMHG

## 2019-02-05 LAB
ANION GAP SERPL CALC-SCNC: 7 MMOL/L
BASOPHILS # BLD AUTO: 0 K/UL (ref 0–0.2)
BASOPHILS NFR BLD AUTO: 1 %
BUN SERPL-SCNC: 13 MG/DL (ref 7–17)
CALCIUM SPEC-MCNC: 9.3 MG/DL (ref 8.4–10.2)
CHLORIDE SERPL-SCNC: 102 MMOL/L (ref 98–107)
CO2 SERPL-SCNC: 30 MMOL/L (ref 22–30)
EOSINOPHIL # BLD AUTO: 0.1 K/UL (ref 0–0.7)
EOSINOPHIL NFR BLD AUTO: 1 %
ERYTHROCYTE [DISTWIDTH] IN BLOOD BY AUTOMATED COUNT: 4.59 M/UL (ref 3.8–5.4)
ERYTHROCYTE [DISTWIDTH] IN BLOOD: 11.9 % (ref 11.5–15.5)
GLUCOSE SERPL-MCNC: 97 MG/DL (ref 74–99)
HCT VFR BLD AUTO: 44.7 % (ref 34–46)
HGB BLD-MCNC: 14.6 GM/DL (ref 11.4–16)
LYMPHOCYTES # SPEC AUTO: 1.8 K/UL (ref 1–4.8)
LYMPHOCYTES NFR SPEC AUTO: 27 %
MCH RBC QN AUTO: 31.7 PG (ref 25–35)
MCHC RBC AUTO-ENTMCNC: 32.5 G/DL (ref 31–37)
MCV RBC AUTO: 97.5 FL (ref 80–100)
MONOCYTES # BLD AUTO: 0.4 K/UL (ref 0–1)
MONOCYTES NFR BLD AUTO: 6 %
NEUTROPHILS # BLD AUTO: 4.2 K/UL (ref 1.3–7.7)
NEUTROPHILS NFR BLD AUTO: 64 %
PLATELET # BLD AUTO: 197 K/UL (ref 150–450)
POTASSIUM SERPL-SCNC: 4.3 MMOL/L (ref 3.5–5.1)
SODIUM SERPL-SCNC: 139 MMOL/L (ref 137–145)
WBC # BLD AUTO: 6.6 K/UL (ref 3.8–10.6)

## 2019-02-07 ENCOUNTER — HOSPITAL ENCOUNTER (INPATIENT)
Dept: HOSPITAL 47 - EC | Age: 59
LOS: 5 days | Discharge: HOME HEALTH | DRG: 191 | End: 2019-02-12
Attending: INTERNAL MEDICINE | Admitting: INTERNAL MEDICINE
Payer: MEDICARE

## 2019-02-07 VITALS — BODY MASS INDEX: 16.5 KG/M2

## 2019-02-07 DIAGNOSIS — Z91.5: ICD-10-CM

## 2019-02-07 DIAGNOSIS — F17.210: ICD-10-CM

## 2019-02-07 DIAGNOSIS — M79.7: ICD-10-CM

## 2019-02-07 DIAGNOSIS — E03.9: ICD-10-CM

## 2019-02-07 DIAGNOSIS — F32.9: ICD-10-CM

## 2019-02-07 DIAGNOSIS — J43.9: Primary | ICD-10-CM

## 2019-02-07 DIAGNOSIS — Z79.890: ICD-10-CM

## 2019-02-07 DIAGNOSIS — Z90.49: ICD-10-CM

## 2019-02-07 DIAGNOSIS — M62.50: ICD-10-CM

## 2019-02-07 DIAGNOSIS — Z79.899: ICD-10-CM

## 2019-02-07 DIAGNOSIS — F60.3: ICD-10-CM

## 2019-02-07 DIAGNOSIS — F51.04: ICD-10-CM

## 2019-02-07 DIAGNOSIS — Z80.1: ICD-10-CM

## 2019-02-07 DIAGNOSIS — J93.12: ICD-10-CM

## 2019-02-07 DIAGNOSIS — Z71.6: ICD-10-CM

## 2019-02-07 DIAGNOSIS — F41.0: ICD-10-CM

## 2019-02-07 DIAGNOSIS — G62.9: ICD-10-CM

## 2019-02-07 LAB
ALBUMIN SERPL-MCNC: 4.4 G/DL (ref 3.5–5)
ALP SERPL-CCNC: 48 U/L (ref 38–126)
ALT SERPL-CCNC: 30 U/L (ref 9–52)
ANION GAP SERPL CALC-SCNC: 6 MMOL/L
APTT BLD: 25.4 SEC (ref 22–30)
AST SERPL-CCNC: 32 U/L (ref 14–36)
BASOPHILS # BLD AUTO: 0 K/UL (ref 0–0.2)
BASOPHILS NFR BLD AUTO: 0 %
BUN SERPL-SCNC: 13 MG/DL (ref 7–17)
CALCIUM SPEC-MCNC: 9.9 MG/DL (ref 8.4–10.2)
CHLORIDE SERPL-SCNC: 101 MMOL/L (ref 98–107)
CK SERPL-CCNC: 128 U/L (ref 30–135)
CO2 SERPL-SCNC: 31 MMOL/L (ref 22–30)
EOSINOPHIL # BLD AUTO: 0.1 K/UL (ref 0–0.7)
EOSINOPHIL NFR BLD AUTO: 1 %
ERYTHROCYTE [DISTWIDTH] IN BLOOD BY AUTOMATED COUNT: 4.64 M/UL (ref 3.8–5.4)
ERYTHROCYTE [DISTWIDTH] IN BLOOD: 11.9 % (ref 11.5–15.5)
GLUCOSE SERPL-MCNC: 119 MG/DL (ref 74–99)
HCT VFR BLD AUTO: 45.5 % (ref 34–46)
HGB BLD-MCNC: 15.1 GM/DL (ref 11.4–16)
INR PPP: 0.9 (ref ?–1.2)
LYMPHOCYTES # SPEC AUTO: 1.6 K/UL (ref 1–4.8)
LYMPHOCYTES NFR SPEC AUTO: 16 %
MAGNESIUM SPEC-SCNC: 1.7 MG/DL (ref 1.6–2.3)
MCH RBC QN AUTO: 32.6 PG (ref 25–35)
MCHC RBC AUTO-ENTMCNC: 33.2 G/DL (ref 31–37)
MCV RBC AUTO: 98.2 FL (ref 80–100)
MONOCYTES # BLD AUTO: 0.5 K/UL (ref 0–1)
MONOCYTES NFR BLD AUTO: 5 %
NEUTROPHILS # BLD AUTO: 7.9 K/UL (ref 1.3–7.7)
NEUTROPHILS NFR BLD AUTO: 78 %
PLATELET # BLD AUTO: 210 K/UL (ref 150–450)
POTASSIUM SERPL-SCNC: 4.3 MMOL/L (ref 3.5–5.1)
PROT SERPL-MCNC: 7.1 G/DL (ref 6.3–8.2)
PT BLD: 10.1 SEC (ref 9–12)
SODIUM SERPL-SCNC: 138 MMOL/L (ref 137–145)
TROPONIN I SERPL-MCNC: <0.012 NG/ML (ref 0–0.03)
WBC # BLD AUTO: 10.1 K/UL (ref 3.8–10.6)

## 2019-02-07 PROCEDURE — 93005 ELECTROCARDIOGRAM TRACING: CPT

## 2019-02-07 PROCEDURE — 71046 X-RAY EXAM CHEST 2 VIEWS: CPT

## 2019-02-07 PROCEDURE — 85730 THROMBOPLASTIN TIME PARTIAL: CPT

## 2019-02-07 PROCEDURE — 80053 COMPREHEN METABOLIC PANEL: CPT

## 2019-02-07 PROCEDURE — 80048 BASIC METABOLIC PNL TOTAL CA: CPT

## 2019-02-07 PROCEDURE — 82553 CREATINE MB FRACTION: CPT

## 2019-02-07 PROCEDURE — 85610 PROTHROMBIN TIME: CPT

## 2019-02-07 PROCEDURE — 82550 ASSAY OF CK (CPK): CPT

## 2019-02-07 PROCEDURE — 71045 X-RAY EXAM CHEST 1 VIEW: CPT

## 2019-02-07 PROCEDURE — 0W9930Z DRAINAGE OF RIGHT PLEURAL CAVITY WITH DRAINAGE DEVICE, PERCUTANEOUS APPROACH: ICD-10-PCS

## 2019-02-07 PROCEDURE — 84484 ASSAY OF TROPONIN QUANT: CPT

## 2019-02-07 PROCEDURE — 99283 EMERGENCY DEPT VISIT LOW MDM: CPT

## 2019-02-07 PROCEDURE — 99291 CRITICAL CARE FIRST HOUR: CPT

## 2019-02-07 PROCEDURE — 36415 COLL VENOUS BLD VENIPUNCTURE: CPT

## 2019-02-07 PROCEDURE — 96374 THER/PROPH/DIAG INJ IV PUSH: CPT

## 2019-02-07 PROCEDURE — 85025 COMPLETE CBC W/AUTO DIFF WBC: CPT

## 2019-02-07 PROCEDURE — 32551 INSERTION OF CHEST TUBE: CPT

## 2019-02-07 PROCEDURE — 83735 ASSAY OF MAGNESIUM: CPT

## 2019-02-07 RX ADMIN — FAMOTIDINE SCH MG: 20 TABLET, FILM COATED ORAL at 19:55

## 2019-02-07 RX ADMIN — GABAPENTIN SCH MG: 300 CAPSULE ORAL at 19:55

## 2019-02-07 RX ADMIN — HYDROMORPHONE HYDROCHLORIDE PRN MG: 1 INJECTION, SOLUTION INTRAMUSCULAR; INTRAVENOUS; SUBCUTANEOUS at 17:52

## 2019-02-07 RX ADMIN — CEFAZOLIN SCH MLS/HR: 330 INJECTION, POWDER, FOR SOLUTION INTRAMUSCULAR; INTRAVENOUS at 18:12

## 2019-02-07 NOTE — ED
SOB HPI





- General


Chief Complaint: Shortness of Breath


Stated Complaint: lung problems-sent by 


Time Seen by Provider: 02/07/19 12:31


Source: patient, RN notes reviewed


Mode of arrival: wheelchair


Limitations: no limitations





- History of Present Illness


Initial Comments: 





This is a 58-year-old female who was a smoker about one pack a day who states 

she has sharp chest pain going up into her shoulders and chest today.  She 

experienced shortness breath with it.  She was seen by her doctor sent over 

here for suspected pneumothorax.  She denies any fevers chills or phlegm 

production no trauma no other modifying factors other than she has of a history 

of thyroid disease.


MD Complaint: shortness of breath, chest pain





- Related Data


 Home Medications











 Medication  Instructions  Recorded  Confirmed


 


Levothyroxine Sodium [Synthroid] 88 mcg PO DAILY 10/25/18 02/07/19


 


traZODone  mg PO HS 10/25/18 02/07/19


 


ARIPiprazole [Abilify] 5 mg PO DAILY 02/04/19 02/07/19


 


ARIPiprazole [Abilify] 10 mg PO HS 02/04/19 02/07/19


 


Gabapentin [Neurontin] 100 mg PO BID 02/04/19 02/07/19


 


Gabapentin [Neurontin] 300 mg PO HS 02/04/19 02/07/19


 


Multivitamins, Thera [Multivitamin 1 tab PO DAILY 02/04/19 02/07/19





(formulary)]   


 


Vortioxetine Hydrobromide 10 mg PO DAILY 02/04/19 02/07/19





[Trintellix]   


 


busPIRone HCL 15 mg PO BID 02/04/19 02/07/19


 


hydrOXYzine PAMOATE [Vistaril] 25 mg PO BID PRN 02/04/19 02/07/19











 Allergies











Allergy/AdvReac Type Severity Reaction Status Date / Time


 


No Known Allergies Allergy   Verified 02/07/19 12:29














Review of Systems


ROS Statement: 


Those systems with pertinent positive or pertinent negative responses have been 

documented in the HPI.





ROS Other: All systems not noted in ROS Statement are negative.





Past Medical History


Past Medical History: Fibromyalgia, Neurologic Disorder, Thyroid Disorder


Additional Past Medical History / Comment(s): muscle wasting, neuropathy


History of Any Multi-Drug Resistant Organisms: None Reported


Past Surgical History: Orthopedic Surgery


Additional Past Surgical History / Comment(s): Surgey on bilateral knees in 

2006. Torn cartilage. Gallstones surgically removed.  carpal tunnel right hand 

surgery


Past Anesthesia/Blood Transfusion Reactions: No Reported Reaction


Additional Past Anesthesia/Blood Transfusion Reaction / Comment(s): Patient 

denies.


Past Psychological History: Anxiety, Depression, Panic Disorder


Smoking Status: Heavy tobacco smoker


Past Alcohol Use History: Occasional


Past Drug Use History: Marijuana





- Past Family History


  ** Mother


Family Medical History: Cancer





  ** Father


Family Medical History: Cancer


Additional Family Medical History / Comment(s): Mother and father passed away 

from lung cancer.





General Exam





- General Exam Comments


Initial Comments: 





This is a well-developed asthenic appearing female who is awake alert oriented 

3


Limitations: no limitations


General appearance: alert, anxious, in distress


Head exam: Present: atraumatic, normocephalic, normal inspection


Eye exam: Present: normal appearance, PERRL, EOMI.  Absent: scleral icterus, 

conjunctival injection, periorbital swelling


ENT exam: Present: normal exam, mucous membranes moist


Neck exam: Present: normal inspection.  Absent: tenderness, meningismus, 

lymphadenopathy


Respiratory exam: Present: decreased breath sounds (Especially on the right).  

Absent: respiratory distress, wheezes, rales, rhonchi, stridor


Cardiovascular Exam: Present: regular rate, normal rhythm, normal heart sounds.

  Absent: systolic murmur, diastolic murmur, rubs, gallop, clicks


GI/Abdominal exam: Present: soft, normal bowel sounds.  Absent: distended, 

tenderness, guarding, rebound, rigid


Extremities exam: Present: normal inspection, full ROM, normal capillary 

refill.  Absent: tenderness, pedal edema, joint swelling, calf tenderness


Back exam: Present: normal inspection


Neurological exam: Present: alert, oriented X3, CN II-XII intact


Psychiatric exam: Present: normal affect, normal mood


Skin exam: Present: warm, dry, intact, normal color.  Absent: rash





Course


 Vital Signs











  02/07/19 02/07/19





  12:30 13:51


 


Temperature 98.4 F 


 


Pulse Rate 91 80


 


Respiratory 20 16





Rate  


 


Blood Pressure 154/88 139/84


 


O2 Sat by Pulse 97 96





Oximetry  














- Reevaluation(s)


Reevaluation #1: 





02/07/19 15:07


The PA x-ray was done immediately following placement of the tube prior to 

suction patient did have increased aeration and did feel improved after the 

tube placement it did show minimal improvement in the pneumothorax 

approximately 3 mL of air was aspirated with increased air sounds.





Procedures





- Chest Tube Insertion


Consent Obtained: emergent situation


Side of Procedure: right


Indication: Pneumothorax


Placed on monitor/pulse oximetry: Yes


Site Prep: Chloroprep


Local Anesthesia: Lidocaine 1%


Amount (mLs): 10


Insertion Site: Other (Second intercostal space right anterior chest wall)


Scalpel: #11


Sutured in Place: No (Thoro-vent)


Attached to Suction: Yes (Suction approximately 300 mL of air)


Type of Suction: Other (Syringe)


Repeat X-ray Results: Other (Improvement though the x-ray was on before suction.

)


Patient Tolerated Procedure: well





- Smoking Cessation


Time Spent Discussing Smoking Cessation w/Patient (Minutes): 3


Patient Acknowledges Need for Cessation: Yes





Medical Decision Making





- Medical Decision Making





I did discuss case with Dr. Agee as well as with Dr. Juan





- Lab Data


Result diagrams: 


 02/07/19 12:35





 02/07/19 12:35


 Lab Results











  02/07/19 02/07/19 02/07/19 Range/Units





  12:35 12:35 12:35 


 


WBC   10.1   (3.8-10.6)  k/uL


 


RBC   4.64   (3.80-5.40)  m/uL


 


Hgb   15.1   (11.4-16.0)  gm/dL


 


Hct   45.5   (34.0-46.0)  %


 


MCV   98.2   (80.0-100.0)  fL


 


MCH   32.6   (25.0-35.0)  pg


 


MCHC   33.2   (31.0-37.0)  g/dL


 


RDW   11.9   (11.5-15.5)  %


 


Plt Count   210   (150-450)  k/uL


 


Neutrophils %   78   %


 


Lymphocytes %   16   %


 


Monocytes %   5   %


 


Eosinophils %   1   %


 


Basophils %   0   %


 


Neutrophils #   7.9 H   (1.3-7.7)  k/uL


 


Lymphocytes #   1.6   (1.0-4.8)  k/uL


 


Monocytes #   0.5   (0-1.0)  k/uL


 


Eosinophils #   0.1   (0-0.7)  k/uL


 


Basophils #   0.0   (0-0.2)  k/uL


 


PT     (9.0-12.0)  sec


 


INR     (<1.2)  


 


APTT     (22.0-30.0)  sec


 


Sodium    138  (137-145)  mmol/L


 


Potassium    4.3  (3.5-5.1)  mmol/L


 


Chloride    101  ()  mmol/L


 


Carbon Dioxide    31 H  (22-30)  mmol/L


 


Anion Gap    6  mmol/L


 


BUN    13  (7-17)  mg/dL


 


Creatinine    0.72  (0.52-1.04)  mg/dL


 


Est GFR (CKD-EPI)AfAm    >90  (>60 ml/min/1.73 sqM)  


 


Est GFR (CKD-EPI)NonAf    >90  (>60 ml/min/1.73 sqM)  


 


Glucose    119 H  (74-99)  mg/dL


 


Calcium    9.9  (8.4-10.2)  mg/dL


 


Magnesium    1.7  (1.6-2.3)  mg/dL


 


Total Bilirubin    0.9  (0.2-1.3)  mg/dL


 


AST    32  (14-36)  U/L


 


ALT    30  (9-52)  U/L


 


Alkaline Phosphatase    48  ()  U/L


 


Total Creatine Kinase  128    ()  U/L


 


CK-MB (CK-2)  1.5    (0.0-2.4)  ng/mL


 


CK-MB (CK-2) Rel Index  1.2    


 


Troponin I  <0.012    (0.000-0.034)  ng/mL


 


Total Protein    7.1  (6.3-8.2)  g/dL


 


Albumin    4.4  (3.5-5.0)  g/dL














  02/07/19 Range/Units





  12:35 


 


WBC   (3.8-10.6)  k/uL


 


RBC   (3.80-5.40)  m/uL


 


Hgb   (11.4-16.0)  gm/dL


 


Hct   (34.0-46.0)  %


 


MCV   (80.0-100.0)  fL


 


MCH   (25.0-35.0)  pg


 


MCHC   (31.0-37.0)  g/dL


 


RDW   (11.5-15.5)  %


 


Plt Count   (150-450)  k/uL


 


Neutrophils %   %


 


Lymphocytes %   %


 


Monocytes %   %


 


Eosinophils %   %


 


Basophils %   %


 


Neutrophils #   (1.3-7.7)  k/uL


 


Lymphocytes #   (1.0-4.8)  k/uL


 


Monocytes #   (0-1.0)  k/uL


 


Eosinophils #   (0-0.7)  k/uL


 


Basophils #   (0-0.2)  k/uL


 


PT  10.1  (9.0-12.0)  sec


 


INR  0.9  (<1.2)  


 


APTT  25.4  (22.0-30.0)  sec


 


Sodium   (137-145)  mmol/L


 


Potassium   (3.5-5.1)  mmol/L


 


Chloride   ()  mmol/L


 


Carbon Dioxide   (22-30)  mmol/L


 


Anion Gap   mmol/L


 


BUN   (7-17)  mg/dL


 


Creatinine   (0.52-1.04)  mg/dL


 


Est GFR (CKD-EPI)AfAm   (>60 ml/min/1.73 sqM)  


 


Est GFR (CKD-EPI)NonAf   (>60 ml/min/1.73 sqM)  


 


Glucose   (74-99)  mg/dL


 


Calcium   (8.4-10.2)  mg/dL


 


Magnesium   (1.6-2.3)  mg/dL


 


Total Bilirubin   (0.2-1.3)  mg/dL


 


AST   (14-36)  U/L


 


ALT   (9-52)  U/L


 


Alkaline Phosphatase   ()  U/L


 


Total Creatine Kinase   ()  U/L


 


CK-MB (CK-2)   (0.0-2.4)  ng/mL


 


CK-MB (CK-2) Rel Index   


 


Troponin I   (0.000-0.034)  ng/mL


 


Total Protein   (6.3-8.2)  g/dL


 


Albumin   (3.5-5.0)  g/dL














- EKG Data


-: EKG Interpreted by Me


EKG shows normal: sinus rhythm (Normal sinus rhythm of 84.  Interval 148 QRS 

duration 74 QT since /432 no acute ST-T wave changes)





- Radiology Data


Radiology results: report reviewed (Did review the imaging and reports patient 

does demonstrate approximately 50% pneumothorax on the right.), image reviewed





Critical Care Time


Critical Care Time: Yes


Critical Care Time: 





34 minutes of critical care time which includes initial presentation with 

history physical labs x-rays several reevaluation the patient.  This does not 

include the time he placed the Thoro-vent.  This also does include discussion 

with the main physician Dr. Farmer as well as Dr. Juan.  This is also 

include order writing and documentation of the above





Disposition


Clinical Impression: 


 Spontaneous pneumothorax, Chest pain, Smoking





Disposition: ADMITTED AS IP TO THIS HOSP


Condition: Stable


Referrals: 


Inge Harris MD [Primary Care Provider] - 1-2 days

## 2019-02-07 NOTE — P.CNPUL
History of Present Illness


Consult date: 02/07/19


Reason for consult: pneumothorax


History of present illness: 





58-year-old here patient, a chronic smoker presented to [facility] because of 

chest pain and shortness of breath.  She was found to have a spontaneous 

pneumothorax on the right side identified by chest x-ray that was done based on 

the chest x-ray that was done in the emergency department.  The right-sided 

pneumothorax was estimated to be around 50%.  No hemodynamic instability.  The 

Thoravent was inserted.  There was partial expansion and the patient was 

admitted to the medical floor.  Currently she is hemodynamically stable.  She 

is free of any chest pain.  Shortness of breath has subsided.  I saw the 

patient a medical floor.  I connected the thoravent to pleuroVAC and wall 

suction.  No previous history of pneumothoraces.  No trauma.  She is a chronic 

smoker in she probably has underlying COPD.





Review of Systems


Constitutional: Denies chills, Denies fever


Eyes: denies as per HPI, denies blurred vision, denies bulging eye, denies 

decreased vision, denies diplopia, denies discharge, denies dry eye, denies 

irritation, denies itching, denies pain, denies photophobia, denies loss of 

peripheral vision, denies loss of vision, denies tunnel vision/blind spots


Ears: deny: decreased hearing, ear discharge, earache, tinnitus


Ears, nose, mouth and throat: Denies headache, Denies sore throat


Cardiovascular: Reports chest pain, Reports dyspnea on exertion


Respiratory: Reports dyspnea


Gastrointestinal: Denies abdominal pain, Denies diarrhea, Denies nausea, Denies 

vomiting


Genitourinary: Denies dysuria, Denies hematuria


Menstruation: Reports as per HPI


Musculoskeletal: Reports as per HPI


Musculoskeletal: absent: ankle pain, ankle stiffness, ankle swelling, as per HPI

, elbow pain, elbow stiffness, elbow swelling, foot pain, foot stiffness, foot 

swelling, hand pain, hand stiffness, hand swelling, hip pain, hip stiffness, 

hip swelling, knee pain, knee stiffness, knee swelling, shoulder pain, shoulder 

stiffness, shoulder swelling, wrist pain, wrist stiffness, wrist swelling


Integumentary: Denies pruritus, Denies rash


Neurological: Denies numbness, Denies weakness


Psychiatric: Reports anxiety, Reports depression, Reports sleep disturbances


Endocrine: Denies fatigue, Denies weight change


Hematologic/Lymphatic: Reports as per HPI


Allergic/Immunologic: Reports as per HPI





Past Medical History


Past Medical History: Fibromyalgia, Neurologic Disorder, Thyroid Disorder


Additional Past Medical History / Comment(s): COPD, anxiety, depression, 

hypothyroidism, peripheral neuropathy, fibromyalgia, chronic back pain, 

borderline personality disorder, chronic Insomnia, Hypothyroidism


History of Any Multi-Drug Resistant Organisms: None Reported


Past Surgical History: Orthopedic Surgery


Additional Past Surgical History / Comment(s): Surgey on bilateral knees in 

2006. Torn cartilage. Gallstones surgically removed.  carpal tunnel right hand 

surgery


Past Anesthesia/Blood Transfusion Reactions: No Reported Reaction


Additional Past Anesthesia/Blood Transfusion Reaction / Comment(s): Patient 

denies.


Past Psychological History: Anxiety, Depression, Panic Disorder


Additional Psychological History / Comment(s): Bordeline Personality Disorder, 

Body Dysmorphia. Patient reports she is open with [facility]. Dr. Mccarthy is her 

psychiatrist and Micaela Ayala is her therapist. Patient has history of 2 [facility] 

addmission. Most recent was at [facility] for suicide attempt.


Smoking Status: Current every day smoker


Past Alcohol Use History: Occasional


Additional Past Alcohol Use History / Comment(s): pt denies alcohol use.


Past Drug Use History: Marijuana





- Past Family History


  ** Mother


Family Medical History: Cancer





  ** Father


Family Medical History: Cancer


Additional Family Medical History / Comment(s): Mother and father passed away 

from lung cancer.





Medications and Allergies


 Home Medications











 Medication  Instructions  Recorded  Confirmed  Type


 


Levothyroxine Sodium [Synthroid] 88 mcg PO DAILY 10/25/18 02/07/19 History


 


traZODone  mg PO HS 10/25/18 02/07/19 History


 


ARIPiprazole [Abilify] 5 mg PO DAILY 02/04/19 02/07/19 History


 


ARIPiprazole [Abilify] 10 mg PO HS 02/04/19 02/07/19 History


 


Gabapentin [Neurontin] 100 mg PO BID 02/04/19 02/07/19 History


 


Gabapentin [Neurontin] 300 mg PO HS 02/04/19 02/07/19 History


 


Multivitamins, Thera [Multivitamin 1 tab PO DAILY 02/04/19 02/07/19 History





(formulary)]    


 


Vortioxetine Hydrobromide 10 mg PO DAILY 02/04/19 02/07/19 History





[Trintellix]    


 


busPIRone HCL 15 mg PO BID 02/04/19 02/07/19 History


 


hydrOXYzine PAMOATE [Vistaril] 25 mg PO BID PRN 02/04/19 02/07/19 History











 Allergies











Allergy/AdvReac Type Severity Reaction Status Date / Time


 


No Known Allergies Allergy   Verified 02/07/19 12:29














Physical Exam


Vitals: 


 Vital Signs











  Temp Pulse Pulse Resp BP BP Pulse Ox


 


 02/07/19 16:55  99.2 F   66  18   126/59  100


 


 02/07/19 16:30   71   14  121/71   99


 


 02/07/19 16:00   68   21  130/79   95


 


 02/07/19 15:30   74   17  126/63   95


 


 02/07/19 15:00   84   20  139/84   95


 


 02/07/19 14:30   72   24  136/91   94 L


 


 02/07/19 14:00   74   16  139/84   95


 


 02/07/19 13:51   80   16  139/84   96


 


 02/07/19 12:30  98.4 F  91   20  154/88   97








 Intake and Output











 02/07/19 02/07/19 02/07/19





 06:59 14:59 22:59


 


Other:   


 


  Weight  43.545 kg 

















Thin and frail nonacute distress, the patient looks quite cachectic and very 

thin


Head exam was generally normal. There was no scleral icterus or corneal arcus. 

Mucous membranes were moist.


Neck was supple and without jugular venous distension, thyromegaly, or carotid 

bruits. Carotids were easily palpable bilaterally. There was no adenopathy.


Lungs sounds revealed diminished breath on the right compared to left.  At 

Thoravent is present over the anterior chest on the right.


Cardiac exam revealed the PMI to be normally situated and sized. The rhythm was 

regular and no extrasystoles were noted during several minutes of auscultation. 

The first and second heart sounds were normal and physiologic splitting of the 

second heart sound was noted. There were no murmurs, rubs, clicks, or gallops.


Abdominal exam revealed normal bowel sounds. The abdomen was soft, non-tender, 

and without masses, organomegaly, or appreciable enlargement of the abdominal 

aorta.


Examination of the extremities revealed easily palpable radial, femoral and 

pedal pulses. There was no cyanosis, clubbing or edema.


Examination of the skin revealed no evidence of significant rashes, suspicious 

appearing nevi or other concerning lesions.





Results





- Laboratory Findings


CBC and BMP: 


 02/07/19 12:35





 02/07/19 12:35


PT/INR, D-dimer











PT  10.1 sec (9.0-12.0)   02/07/19  12:35    


 


INR  0.9  (<1.2)   02/07/19  12:35    








Abnormal lab findings: 


 Abnormal Labs











  02/07/19 02/07/19





  12:35 12:35


 


Neutrophils #  7.9 H 


 


Carbon Dioxide   31 H


 


Glucose   119 H














- Diagnostic Findings


Chest x-ray: image reviewed





Assessment and Plan


Plan: 








Assessment





1 spontaneous pneumothorax, likely secondary to underlying COPD.  The patient 

to 50% pneumothorax on the right and the patient had a Thoravent inserted with 

partial reexpansion





2 smoker





3 chronic insomnia





4 fibromyalgia





5 chronic anxiety





6 depression





7 borderline percent disorder





8 hypothyroidism





9 peripheral neuropathy





Plan





Daily chest x-rays.  Incentive spirometer.  We'll attention to Thoravent to a 

Pleur-evac.  The patient will be counseled for smoking cessation.  We'll 

continue to follow.  Resume outpatient medications.  Provide patient incentive 

spirometer.

## 2019-02-07 NOTE — XR
EXAMINATION TYPE: XR chest 1V portable

 

DATE OF EXAM: 2/7/2019

 

HISTORY: Pneumothorax follow-up

 

COMPARISON: None.

 

TECHNIQUE: Single view of the chest is submitted.

 

FINDINGS:

Right-sided pleural catheter is noted. No substantial diminution in right-sided large pneumothorax.

 

There is no evidence for focal infiltrate. 

 

The heart is stable.

 

Hilar and mediastinal structures are within normal limits.  

 

Degenerative changes are seen of the dorsal spine. 

 

 IMPRESSION: 

 

1.  Right-sided pleural catheter is noted. No substantial diminution in right-sided large pneumothora
x.

## 2019-02-07 NOTE — XR
EXAMINATION TYPE: XR chest 1V portable

 

DATE OF EXAM: 2/7/2019

 

HISTORY: Shortness of breath.

 

COMPARISON: None.

 

TECHNIQUE: Single view of the chest is submitted.

 

FINDINGS:

There is a large right-sided pneumothorax estimated at 50%.

 

There is no evidence for focal infiltrate. 

 

The heart is stable.

 

Hilar and mediastinal structures are within normal limits.  

 

Degenerative changes are seen of the dorsal spine. 

 

 IMPRESSION: 

 

1.  There is a large right-sided pneumothorax estimated at 50%.

 

ER physician was notified at the time of exam completion.

## 2019-02-07 NOTE — P.HPIM
History of Present Illness


58-year-old the female with history of smoking which she is trying to cut it 

down With comments of shortness of breath cough patient presented with similar 

symptoms to PCPs office where she had a chest x-ray which showed pneumothorax 

patient was sent in here patient is found as part in his pneumothorax occupying 

up at least 50% of the lung for which patient received 4 underwent and the 

patient is feeling better patient was having severe a pleuritic chest pain 

which improved but patient is still complaining of some shoulder pain.  And 

shortness of breath improved cough improved.  Patient denied any fever chills.








Review of Systems








REVIEW OF SYSTEMS: 


CONSTITUTIONAL: No fever, no malaise, no fatigue. 


HEENT: No recent visual problems or hearing problems. Denied any sore throat. 


CARDIOVASCULAR: No  orthopnea, PND, no palpitations, no syncope. 


PULMONARY:  no hemoptysis. 


GASTROINTESTINAL: No diarrhea, no nausea, no vomiting, no abdominal pain. 


NEUROLOGICAL: No headaches, no weakness, no numbness. 


HEMATOLOGICAL: Denies any bleeding or petechiae. 


GENITOURINARY: Denies any burning micturition, frequency, or urgency. 


MUSCULOSKELETAL/RHEUMATOLOGICAL: Denies any joint pain, swelling, or any muscle 

pain. 


ENDOCRINE: Denies any polyuria or polydipsia. 





The rest of the 14-point review of systems is negative.











Past Medical History


Past Medical History: Fibromyalgia, Neurologic Disorder, Thyroid Disorder


Additional Past Medical History / Comment(s): COPD, anxiety, depression, 

hypothyroidism, peripheral neuropathy, fibromyalgia, chronic back pain, 

borderline personality disorder, chronic Insomnia, Hypothyroidism


History of Any Multi-Drug Resistant Organisms: None Reported


Past Surgical History: Orthopedic Surgery


Additional Past Surgical History / Comment(s): Surgey on bilateral knees in 

2006. Torn cartilage. Gallstones surgically removed.  carpal tunnel right hand 

surgery


Past Anesthesia/Blood Transfusion Reactions: No Reported Reaction


Additional Past Anesthesia/Blood Transfusion Reaction / Comment(s): Patient 

denies.


Past Psychological History: Anxiety, Depression, Panic Disorder


Additional Psychological History / Comment(s): [place] Personality Disorder, 

Body Dysmorphia. Patient reports she is open with [facility]. Dr. Mccarthy is her 

psychiatrist and Micaela Ayala is her therapist. Patient has history of 2 [facility] 

addmission. Most recent was at [facility] for suicide attempt.


Smoking Status: Current every day smoker


Past Alcohol Use History: Occasional


Additional Past Alcohol Use History / Comment(s): pt denies alcohol use.


Past Drug Use History: Marijuana





- Past Family History


  ** Mother


Family Medical History: Cancer





  ** Father


Family Medical History: Cancer


Additional Family Medical History / Comment(s): Mother and father passed away 

from lung cancer.





Medications and Allergies


 Home Medications











 Medication  Instructions  Recorded  Confirmed  Type


 


Levothyroxine Sodium [Synthroid] 88 mcg PO DAILY 10/25/18 02/07/19 History


 


traZODone  mg PO HS 10/25/18 02/07/19 History


 


ARIPiprazole [Abilify] 5 mg PO DAILY 02/04/19 02/07/19 History


 


ARIPiprazole [Abilify] 10 mg PO HS 02/04/19 02/07/19 History


 


Gabapentin [Neurontin] 100 mg PO BID 02/04/19 02/07/19 History


 


Gabapentin [Neurontin] 300 mg PO HS 02/04/19 02/07/19 History


 


Multivitamins, Thera [Multivitamin 1 tab PO DAILY 02/04/19 02/07/19 History





(formulary)]    


 


Vortioxetine Hydrobromide 10 mg PO DAILY 02/04/19 02/07/19 History





[Trintellix]    


 


busPIRone HCL 15 mg PO BID 02/04/19 02/07/19 History


 


hydrOXYzine PAMOATE [Vistaril] 25 mg PO BID PRN 02/04/19 02/07/19 History











 Allergies











Allergy/AdvReac Type Severity Reaction Status Date / Time


 


No Known Allergies Allergy   Verified 02/07/19 12:29














Physical Exam


Vitals: 


 Vital Signs











  Temp Pulse Pulse Resp BP BP Pulse Ox


 


 02/07/19 16:55  99.2 F   66  18   126/59  100


 


 02/07/19 16:30   71   14  121/71   99


 


 02/07/19 16:00   68   21  130/79   95


 


 02/07/19 15:30   74   17  126/63   95


 


 02/07/19 15:00   84   20  139/84   95


 


 02/07/19 14:30   72   24  136/91   94 L


 


 02/07/19 14:00   74   16  139/84   95


 


 02/07/19 13:51   80   16  139/84   96


 


 02/07/19 12:30  98.4 F  91   20  154/88   97








 Intake and Output











 02/07/19 02/07/19 02/07/19





 06:59 14:59 22:59


 


Other:   


 


  Weight  43.545 kg 

















PHYSICAL EXAMINATION: 





GENERAL: The patient is alert and oriented x3, not in any acute distress.  Thin 

built female


HEENT: Pupils are round and equally reacting to light. EOMI. No scleral 

icterus. No conjunctival pallor. Normocephalic, atraumatic. No pharyngeal 

erythema. No thyromegaly. 


CARDIOVASCULAR: S1 and S2 present. No murmurs, rubs, or gallops. 


PULMONARY: Chest is clear to auscultation, no wheezing or crackles. 


ABDOMEN: Soft, nontender, nondistended, normoactive bowel sounds. No palpable 

organomegaly. 


MUSCULOSKELETAL: No joint swelling or deformity.


EXTREMITIES: No cyanosis, clubbing, or pedal edema. 


NEUROLOGICAL: Gross neurological examination did not reveal any focal deficits. 


SKIN: No rashes. 

















Results


CBC & Chem 7: 


 02/07/19 12:35





 02/07/19 12:35


Labs: 


 Abnormal Lab Results - Last 24 Hours (Table)











  02/07/19 02/07/19 Range/Units





  12:35 12:35 


 


Neutrophils #  7.9 H   (1.3-7.7)  k/uL


 


Carbon Dioxide   31 H  (22-30)  mmol/L


 


Glucose   119 H  (74-99)  mg/dL














Thrombosis Risk Factor Assmnt





- Choose All That Apply


Any of the Below Risk Factors Present?: Yes


Each Factor Represents 1 point: Age 41-60 years


Thrombosis Risk Factor Assessment Total Risk Factor Score: 1


Thrombosis Risk Factor Assessment Level: Low Risk





Assessment and Plan


Plan: 


-Spontaneous pneumothorax probably secondary to ruptured bullae and underlying 

COPD admitted physical edema: Patient had a thorough vent that was placed with 

the significant expansion discernment will be connected to vacuum suction.  

Patient will be monitored repeat chest x-ray tomorrow we will use Toradol for 

pain and GI prophylaxis.


-Nicotine abuse with possibility of undiagnosed COPD not in acute exacerbation


-Fibromyalgia


-Depression and anxiety disorder


-Hypothyroidism


-Peripheral neuropathy etiology is unknown.

## 2019-02-08 RX ADMIN — NICOTINE SCH: 14 PATCH, EXTENDED RELEASE TRANSDERMAL at 11:59

## 2019-02-08 RX ADMIN — FAMOTIDINE SCH MG: 20 TABLET, FILM COATED ORAL at 20:43

## 2019-02-08 RX ADMIN — CEFAZOLIN SCH: 330 INJECTION, POWDER, FOR SOLUTION INTRAMUSCULAR; INTRAVENOUS at 14:34

## 2019-02-08 RX ADMIN — HYDROMORPHONE HYDROCHLORIDE PRN MG: 1 INJECTION, SOLUTION INTRAMUSCULAR; INTRAVENOUS; SUBCUTANEOUS at 22:10

## 2019-02-08 RX ADMIN — GABAPENTIN SCH MG: 100 CAPSULE ORAL at 17:31

## 2019-02-08 RX ADMIN — GABAPENTIN SCH MG: 300 CAPSULE ORAL at 20:43

## 2019-02-08 RX ADMIN — HYDROMORPHONE HYDROCHLORIDE PRN MG: 1 INJECTION, SOLUTION INTRAMUSCULAR; INTRAVENOUS; SUBCUTANEOUS at 03:11

## 2019-02-08 RX ADMIN — THERA TABS SCH EACH: TAB at 12:00

## 2019-02-08 RX ADMIN — VORTIOXETINE SCH MG: 10 TABLET, FILM COATED ORAL at 09:08

## 2019-02-08 RX ADMIN — LEVOTHYROXINE SODIUM SCH MCG: 88 TABLET ORAL at 06:06

## 2019-02-08 RX ADMIN — FAMOTIDINE SCH MG: 20 TABLET, FILM COATED ORAL at 09:07

## 2019-02-08 RX ADMIN — GABAPENTIN SCH MG: 100 CAPSULE ORAL at 09:07

## 2019-02-08 NOTE — XR
EXAMINATION TYPE: XR chest 2V

 

DATE OF EXAM: 2/8/2019

 

COMPARISON: 2/7/2019

 

HISTORY: Pneumothorax.

 

TECHNIQUE:  Frontal and lateral views of the chest are obtained.

 

FINDINGS:  There is decrease in size of the right pneumothorax in comparison to the prior of 2/7/2019
 with small apical pneumothorax remaining estimated approximately 10% with maximal apical pleural sep
aration of 1.8 cm. Right-sided Pleur-evac is noted. Right axillary subcutaneous emphysema is seen. Re
mainder the lungs are clear. Mediastinum is elongated however remains midline.

 

IMPRESSION:  Improved degree of the right-sided pneumothorax now estimated at approximately 10% with 
new right axillary subcutaneous edema.

## 2019-02-08 NOTE — XR
EXAMINATION TYPE: XR chest 1V

 

DATE OF EXAM: 2/8/2019

 

CLINICAL HISTORY: Chest tube progress study, suction discontinued for one hour.  

 

TECHNIQUE: Single AP portable upright view of the chest is obtained.

 

COMPARISON: Chest x-ray from earlier today

 

FINDINGS:  For vent catheter overlies lateral right upper chest. There is increase in size of right-s
ided pneumothorax after suction discontinued along right apical and lateral margin of the lung. Adjac
ent subcutaneous emphysema is redemonstrated. Perhaps slight mediastinal shift to the right is noted.
 No new focal airspace opacity is present. Left lung remains clear. Cholecystectomy clips are redemon
strated. Cardiac silhouette size is within normal limits.

 

IMPRESSION: Small to moderate size right pneumothorax estimated 15-20% increased in size after suctio
n is discontinued on Thoravent.

## 2019-02-08 NOTE — P.PN
Subjective


Progress Note Date: 02/08/19


Principal diagnosis: 


 spontaneous pneumothorax, right side, status post Thoravent insertion





58-year-old here patient, a chronic smoker presented to [facility] because of 

chest pain and shortness of breath.  She was found to have a spontaneous 

pneumothorax on the right side identified by chest x-ray that was done based on 

the chest x-ray that was done in the emergency department.  The right-sided 

pneumothorax was estimated to be around 50%.  No hemodynamic instability.  The 

Thoravent was inserted.  There was partial expansion and the patient was 

admitted to the medical floor.  Currently she is hemodynamically stable.  She 

is free of any chest pain.  Shortness of breath has subsided.  I saw the 

patient a medical floor.  I connected the thoravent to pleuroVAC and wall 

suction.  No previous history of pneumothoraces.  No trauma.  She is a chronic 

smoker in she probably has underlying COPD.





On 02/08/2019 patient seen in follow-up. right-sided Thora-Vent, today's chest x

-ray showed improved degree of the right-sided pneumothorax and was estimated 

at 10% with the new right axillary subcutaneous edema.  No chest pain, no 

worsening dyspnea, patient is working on incentive spirometer, vital signs are 

stable, patient's Thoravent was capped with a one-way valve, follow-up chest x-

ray showed increase in the right-sided pneumothorax by 15-20%. Thoravent was 

placed back to suction. Repeat chest x-ray in the morning.breath sounds heard 

over right upper and lower lobes, patient is maintaining good oxygenation, room 

air pulse ox is 93%.  Hemodynamically patient stable, bubbling was noted in the 

water seal chamber after placement to suction, and stopped after a couple 

minutes. 








Objective





- Vital Signs


Vital signs: 


 Vital Signs











Temp  97.2 F L  02/08/19 16:23


 


Pulse  71   02/08/19 16:23


 


Resp  20   02/08/19 16:23


 


BP  114/67   02/08/19 16:23


 


Pulse Ox  93 L  02/08/19 16:23








 Intake & Output











 02/07/19 02/08/19 02/08/19





 18:59 06:59 18:59


 


Intake Total 240  480


 


Output Total   400


 


Balance 240  80


 


Weight 43.545 kg 41 kg 41 kg


 


Intake:   


 


  Oral 240  480


 


Output:   


 


  Urine   400


 


Other:   


 


  Voiding Method  Toilet 


 


  # Voids  1 














- Exam


 GENERAL EXAM: Alert, pleasant, 58-year-old white female, comfortable in no 

apparent distress.


HEAD: Normocephalic/atraumatic.


EYES: Normal reaction of pupils, equal size.  Conjunctiva pink, sclera white.


NOSE: Clear with pink turbinates.


THROAT: No erythema or exudates.


NECK: No masses, no JVD, no thyroid enlargement, no adenopathy.


CHEST: No chest wall deformity.  right upper chest Thoravent present, to 

Pleuravac and wall suction, minimal serosanguineous output in the Pleur-evac


LUNGS: Equal air entry with no crackles, wheeze, rhonchi or dullness.


CVS: Regular rate and rhythm, normal S1 and S2, no gallops, no murmurs, no rubs


ABDOMEN: Soft, nontender.  No hepatosplenomegaly, normal bowel sounds, no 

guarding or rigidity.


EXTREMITIES: No clubbing, no edema, no cyanosis, 2+ pulses and upper and lower 

extremities.


MUSCULOSKELETAL: Muscle strength and tone normal.


SPINE: No scoliosis or deformity


SKIN: No rashes


CENTRAL NERVOUS SYSTEM: Alert and oriented -3.  No focal deficits, tone is 

normal in all 4 extremities.


PSYCHIATRIC: Alert and oriented -3.  Appropriate affect.  Intact judgment and 

insight.











- Labs


CBC & Chem 7: 


 02/07/19 12:35





 02/07/19 12:35





Assessment and Plan


Plan: 


1 spontaneous pneumothorax, likely secondary to underlying COPD.  The patient 

to 50% pneumothorax on the right and the patient had a Thoravent inserted with 

partial reexpansion





2 smoker





3 chronic insomnia





4 fibromyalgia





5 chronic anxiety





6 depression





7 borderline percent disorder





8 hypothyroidism





9 peripheral neuropathy





Plan:





We'll obtain a repeat chest x-ray in the morning, today we attempted to 

Thoravent with the one way valve cap, and follow-up chest x-ray showed increase 

in the size of the right-sided pneumothorax, after which theThoravent was 

placed back to suction. Encouraging deep breathing and coughing, daily chest x-

rays.  No chest pain, no difficulty breathing.  





I performed a history & physical examination of the patient and discussed their 

management with my nurse practitioner, Lori Bergeron.  I reviewed the nurse 

practitioner's note and agree with the documented findings and plan of care.  

Lung sounds are positive for clear breath sounds.  The findings and the 

impression was discussed with the patient.  I attest to the documentation by 

the nurse practitioner. 








Time with Patient: Less than 30

## 2019-02-08 NOTE — P.PN
Subjective


58-year-old the female admitted for spottiness pneumothorax in the right side 

repeat chest x-ray showed resolving pneumothorax.  Patient is presently 

contacted to suction and has a prominent in place patient has mild subcutaneous 

emphysema as well as asked x-ray as well as clinically mild crepitus patient is 

comparing of pain in the right shoulder area.





Constitutional: Denied any fatigue denied any fever.


Cardio vascular: denied any chest pain, palpitations


Gastrointestinal denied any nausea vomiting


Pulmonary: Denied any shortness of breath cough


Neurologic denied any new focal deficits





All inpatient medications were reviewed and appropriate changes in these 

medications as dictated in the interval history and assessment and plan.








Objective





- Vital Signs


Vital signs: 


 Vital Signs











Temp  98 F   02/08/19 09:18


 


Pulse  69   02/08/19 09:18


 


Resp  15   02/08/19 09:18


 


BP  109/56   02/08/19 09:18


 


Pulse Ox  93 L  02/08/19 09:18








 Intake & Output











 02/07/19 02/08/19 02/08/19





 18:59 06:59 18:59


 


Intake Total 240  240


 


Output Total   400


 


Balance 240  -160


 


Weight 43.545 kg 41 kg 


 


Intake:   


 


  Oral 240  240


 


Output:   


 


  Urine   400


 


Other:   


 


  Voiding Method  Toilet 


 


  # Voids  1 














- Exam





PHYSICAL EXAMINATION: 





GENERAL: The patient is alert and oriented x3, not in any acute distress.  Thin 

built female


HEENT: Pupils are round and equally reacting to light. EOMI. No scleral 

icterus. No conjunctival pallor. Normocephalic, atraumatic. No pharyngeal 

erythema. No thyromegaly. 


CARDIOVASCULAR: S1 and S2 present. No murmurs, rubs, or gallops. 


PULMONARY: Chest is clear to auscultation, no wheezing or crackles. 


ABDOMEN: Soft, nontender, nondistended, normoactive bowel sounds. No palpable 

organomegaly. 


MUSCULOSKELETAL: No joint swelling or deformity.


EXTREMITIES: No cyanosis, clubbing, or pedal edema. 


NEUROLOGICAL: Gross neurological examination did not reveal any focal deficits. 


SKIN: No rashes. 








- Labs


CBC & Chem 7: 


 02/07/19 12:35





 02/07/19 12:35


Labs: 


 Abnormal Lab Results - Last 24 Hours (Table)











  02/07/19 02/07/19 Range/Units





  12:35 12:35 


 


Neutrophils #  7.9 H   (1.3-7.7)  k/uL


 


Carbon Dioxide   31 H  (22-30)  mmol/L


 


Glucose   119 H  (74-99)  mg/dL














Assessment and Plan


Plan: 


-Spontaneous pneumothorax probably secondary to ruptured bullae and underlying 

COPD and emphysema: Patient had a thorough vent that was placed with the 

significant expansion discernment will be connected to vacuum suction.  Repeat 

chest x-ray showed improvement in pneumothorax patient will be continued on 

Toradol along with GI prophylaxis


-Nicotine abuse with possibility of undiagnosed COPD not in acute exacerbation


-Fibromyalgia


-Depression and anxiety disorder


-Hypothyroidism


-Peripheral neuropathy etiology is unknown.

## 2019-02-09 RX ADMIN — NICOTINE SCH: 14 PATCH, EXTENDED RELEASE TRANSDERMAL at 10:23

## 2019-02-09 RX ADMIN — GABAPENTIN SCH MG: 100 CAPSULE ORAL at 09:38

## 2019-02-09 RX ADMIN — GABAPENTIN SCH MG: 100 CAPSULE ORAL at 17:16

## 2019-02-09 RX ADMIN — FAMOTIDINE SCH MG: 20 TABLET, FILM COATED ORAL at 09:38

## 2019-02-09 RX ADMIN — GABAPENTIN SCH MG: 300 CAPSULE ORAL at 22:11

## 2019-02-09 RX ADMIN — HYDROMORPHONE HYDROCHLORIDE PRN MG: 1 INJECTION, SOLUTION INTRAMUSCULAR; INTRAVENOUS; SUBCUTANEOUS at 12:45

## 2019-02-09 RX ADMIN — FAMOTIDINE SCH MG: 20 TABLET, FILM COATED ORAL at 22:10

## 2019-02-09 RX ADMIN — CEFAZOLIN SCH: 330 INJECTION, POWDER, FOR SOLUTION INTRAMUSCULAR; INTRAVENOUS at 17:16

## 2019-02-09 RX ADMIN — VORTIOXETINE SCH MG: 10 TABLET, FILM COATED ORAL at 09:38

## 2019-02-09 RX ADMIN — KETOROLAC TROMETHAMINE PRN MG: 30 INJECTION, SOLUTION INTRAMUSCULAR at 10:23

## 2019-02-09 RX ADMIN — THERA TABS SCH EACH: TAB at 11:58

## 2019-02-09 RX ADMIN — LEVOTHYROXINE SODIUM SCH MCG: 88 TABLET ORAL at 06:35

## 2019-02-09 NOTE — P.PN
Subjective


58-year-old the female admitted for spottiness pneumothorax in the right side 

repeat chest x-ray showed resolving pneumothorax.  Patient is presently 

contacted to suction and has a prominent in place patient has mild subcutaneous 

emphysema as well as asked x-ray as well as clinically mild crepitus patient is 

comparing of pain in the right shoulder area.





02/09/2019


No overnight events patient is feeling well and Pneumovax continues to improve 

patient the still connected to suction





Constitutional: Denied any fatigue denied any fever.


Cardio vascular: denied any chest pain, palpitations


Gastrointestinal denied any nausea vomiting


Pulmonary: Denied any shortness of breath cough


Neurologic denied any new focal deficits





All inpatient medications were reviewed and appropriate changes in these 

medications as dictated in the interval history and assessment and plan.








Objective





- Vital Signs


Vital signs: 


 Vital Signs











Temp  97.9 F   02/09/19 08:00


 


Pulse  68   02/09/19 08:00


 


Resp  16   02/09/19 08:00


 


BP  94/49   02/09/19 08:00


 


Pulse Ox  94 L  02/09/19 08:00








 Intake & Output











 02/08/19 02/09/19 02/09/19





 18:59 06:59 18:59


 


Intake Total 702  


 


Output Total 1100 0 


 


Balance -398 0 


 


Weight 41 kg 41.5 kg 


 


Intake:   


 


  Oral 702  


 


Output:   


 


  Urine 1100 0 


 


Other:   


 


  Voiding Method  Toilet 


 


  # Voids  0 














- Exam





PHYSICAL EXAMINATION: 





GENERAL: The patient is alert and oriented x3, not in any acute distress.  Thin 

built female


HEENT: Pupils are round and equally reacting to light. EOMI. No scleral 

icterus. No conjunctival pallor. Normocephalic, atraumatic. No pharyngeal 

erythema. No thyromegaly. 


CARDIOVASCULAR: S1 and S2 present. No murmurs, rubs, or gallops. 


PULMONARY: Chest is clear to auscultation, no wheezing or crackles. 


ABDOMEN: Soft, nontender, nondistended, normoactive bowel sounds. No palpable 

organomegaly. 


MUSCULOSKELETAL: No joint swelling or deformity.


EXTREMITIES: No cyanosis, clubbing, or pedal edema. 


NEUROLOGICAL: Gross neurological examination did not reveal any focal deficits. 


SKIN: No rashes. 








- Labs


CBC & Chem 7: 


 02/07/19 12:35





 02/07/19 12:35





Assessment and Plan


Plan: 


-Spontaneous pneumothorax probably secondary to ruptured bullae and underlying 

COPD and emphysema: Patient had a thorough vent that was placed with the 

significant expansion discernment will be connected to vacuum suction.  Repeat 

chest x-ray showed improvement in pneumothorax patient will be continued on 

Toradol along with GI prophylaxis


-Nicotine abuse with possibility of undiagnosed COPD not in acute exacerbation


-Fibromyalgia


-Depression and anxiety disorder


-Hypothyroidism


-Peripheral neuropathy etiology is unknown.

## 2019-02-09 NOTE — P.PN
Subjective


Progress Note Date: 02/09/19


Principal diagnosis: 





Spontaneous pneumothorax.





58-year-old here patient, a chronic smoker presented to [facility] because of 

chest pain and shortness of breath.  She was found to have a spontaneous 

pneumothorax on the right side identified by chest x-ray that was done based on 

the chest x-ray that was done in the emergency department.  The right-sided 

pneumothorax was estimated to be around 50%.  No hemodynamic instability.  The 

Thoravent was inserted.  There was partial expansion and the patient was 

admitted to the medical floor.  Currently she is hemodynamically stable.  She 

is free of any chest pain.  Shortness of breath has subsided.  I saw the 

patient a medical floor.  I connected the thoravent to pleuroVAC and wall 

suction.  No previous history of pneumothoraces.  No trauma.  She is a chronic 

smoker in she probably has underlying COPD.





On 02/08/2019 patient seen in follow-up. right-sided Thora-Vent, today's chest x

-ray showed improved degree of the right-sided pneumothorax and was estimated 

at 10% with the new right axillary subcutaneous edema.  No chest pain, no 

worsening dyspnea, patient is working on incentive spirometer, vital signs are 

stable, patient's Thoravent was capped with a one-way valve, follow-up chest x-

ray showed increase in the right-sided pneumothorax by 15-20%. Thoravent was 

placed back to suction. Repeat chest x-ray in the morning.breath sounds heard 

over right upper and lower lobes, patient is maintaining good oxygenation, room 

air pulse ox is 93%.  Hemodynamically patient stable, bubbling was noted in the 

water seal chamber after placement to suction, and stopped after a couple 

minutes.





Patient is seen today 02/09/2018 in follow-up on the selective care unit.  She 

remains awake and alert in no acute distress.  Right-sided Thora vent remains 

in place into wall suction.  Today's chest x-ray shows resolution of the 

spontaneous pneumothorax.  Yesterday we trialed her off the suction and her 

pneumothorax returned at 15-20%.  We'll give her another trial tomorrow.  He 

denies any worsening shortness of breath, cough or congestion.  She is 

maintaining good O2 saturations in the 90s on room air.  She's been afebrile.  

Hemodynamically stable.





Objective





- Vital Signs


Vital signs: 


 Vital Signs











Temp  98.7 F   02/09/19 11:56


 


Pulse  65   02/09/19 11:56


 


Resp  18   02/09/19 11:56


 


BP  110/52   02/09/19 11:56


 


Pulse Ox  96   02/09/19 11:56








 Intake & Output











 02/08/19 02/09/19 02/09/19





 18:59 06:59 18:59


 


Intake Total 702  480


 


Output Total 1100 0 800


 


Balance -398 0 -320


 


Weight 41 kg 41.5 kg 


 


Intake:   


 


  Oral 702  480


 


Output:   


 


  Urine 1100 0 800


 


Other:   


 


  Voiding Method  Toilet 


 


  # Voids  0 














- Exam





GENERAL EXAM: Alert, active, comfortable in no apparent distress.  On room air.


HEAD: Normocephalic.


EYES: Normal reaction of pupils, equal size.


NOSE: Clear with pink turbinates.


THROAT: No erythema or exudates.


NECK: No masses, no JVD.


CHEST: No chest wall deformity.


LUNGS: Equal air entry with no crackles, wheeze, rhonchi or dullness.


CVS: S1 and S2 normal with no audible murmur, regular rhythm.


ABDOMEN: No hepatosplenomegaly, normal bowel sounds, no guarding or rigidity.


SPINE: No scoliosis or deformity


SKIN: No rashes


CENTRAL NERVOUS SYSTEM: No focal deficits, tone is normal in all 4 extremities.


EXTREMITIES: There is no peripheral edema.  No clubbing, no cyanosis.  

Peripheral pulses are intact.





- Labs


CBC & Chem 7: 


 02/07/19 12:35





 02/07/19 12:35





Assessment and Plan


Assessment: 





1 spontaneous pneumothorax, likely secondary to underlying COPD.  The patient 

to 50% pneumothorax on the right and the patient had a Thoravent inserted with 

partial reexpansion





2 smoker





3 chronic insomnia





4 fibromyalgia





5 chronic anxiety





6 depression





7 borderline percent disorder





8 hypothyroidism





9 peripheral neuropathy





Plan:





The patient was seen and evaluated by Dr. Juan.  Today's chest x-ray shows 

no significant pneumothorax.  We will continue with wall suction to the right 

sided Thora-Vent another 24 hours.  Repeat a chest x-ray in the a.m.  She is 

again educated regarding the importance of complete smoking cessation.  We'll 

continue to follow.





I, the cosigning physician, performed a history & physical examination of the 

patient. Lungs sounds are clear, diminished.  Maintaining good O2 saturations 

in the 90s on room air.  I discussed the assessment and plan of care with my 

nurse practitioner, Ly Bishop. I attest to the above note as dictated by her.

## 2019-02-09 NOTE — XR
EXAMINATION TYPE: XR chest 1V portable

 

DATE OF EXAM: 2/9/2019

 

CLINICAL HISTORY: Difficulty breathing progress study.  

 

TECHNIQUE: Single AP portable upright view of the chest is obtained.

 

COMPARISON: Chest x-ray from one day earlier

 

FINDINGS:  

The cardiomediastinal silhouette is unchanged.

 

Right-sided pleural catheter is stable in position. Decreased size of right-sided pneumothorax. The p
eriphery of the right lung shows a vague hazy opacity which is likely related to the catheter placeme
nt. Left lung is clear. No pleural effusion. The subcutaneous air seen in the region of the right axi
lla is unchanged.

 

IMPRESSION:  

1. Interval decreased size of right-sided pneumothorax.

2. Stable position of pleural catheter.

## 2019-02-10 RX ADMIN — CEFAZOLIN SCH: 330 INJECTION, POWDER, FOR SOLUTION INTRAMUSCULAR; INTRAVENOUS at 18:35

## 2019-02-10 RX ADMIN — GABAPENTIN SCH MG: 300 CAPSULE ORAL at 20:42

## 2019-02-10 RX ADMIN — HYDROMORPHONE HYDROCHLORIDE PRN MG: 1 INJECTION, SOLUTION INTRAMUSCULAR; INTRAVENOUS; SUBCUTANEOUS at 09:08

## 2019-02-10 RX ADMIN — LEVOTHYROXINE SODIUM SCH MCG: 88 TABLET ORAL at 06:35

## 2019-02-10 RX ADMIN — FAMOTIDINE SCH MG: 20 TABLET, FILM COATED ORAL at 20:42

## 2019-02-10 RX ADMIN — VORTIOXETINE SCH MG: 10 TABLET, FILM COATED ORAL at 09:07

## 2019-02-10 RX ADMIN — GABAPENTIN SCH MG: 100 CAPSULE ORAL at 09:07

## 2019-02-10 RX ADMIN — HYDROMORPHONE HYDROCHLORIDE PRN MG: 1 INJECTION, SOLUTION INTRAMUSCULAR; INTRAVENOUS; SUBCUTANEOUS at 18:31

## 2019-02-10 RX ADMIN — NICOTINE SCH: 14 PATCH, EXTENDED RELEASE TRANSDERMAL at 10:41

## 2019-02-10 RX ADMIN — GABAPENTIN SCH MG: 100 CAPSULE ORAL at 15:13

## 2019-02-10 RX ADMIN — THERA TABS SCH EACH: TAB at 09:07

## 2019-02-10 RX ADMIN — FAMOTIDINE SCH MG: 20 TABLET, FILM COATED ORAL at 09:07

## 2019-02-10 NOTE — XR
EXAMINATION TYPE: XR chest 1V portable

 

DATE OF EXAM: 2/10/2019

 

COMPARISON: 2/9/2019

 

INDICATION: Pneumothorax

 

TECHNIQUE: Single frontal view of the chest is obtained.

 

FINDINGS:  

The heart size is normal.  

The pulmonary vasculature is normal.  

The lungs are clear.  

No definite pneumothorax is evident. Right-sided chest tube remains present. There is right-sided sub
cutaneous emphysema present.

 

IMPRESSION:  

1. No pneumothorax. Chest tube remains in position.

## 2019-02-10 NOTE — P.PN
Subjective


58-year-old the female admitted for spottiness pneumothorax in the right side 

repeat chest x-ray showed resolving pneumothorax.  Patient is presently 

contacted to suction and has a prominent in place patient has mild subcutaneous 

emphysema as well as asked x-ray as well as clinically mild crepitus patient is 

comparing of pain in the right shoulder area.





02/09/2019


No overnight events patient is feeling well and Pneumovax continues to improve 

patient the still connected to suction





02/10/2019


Patient continues to be connected to wall suction, probably this was suction 

can be discontinued and monitor her today and if she does well she can be 

discharged tomorrow





Constitutional: Denied any fatigue denied any fever.


Cardio vascular: denied any chest pain, palpitations


Gastrointestinal denied any nausea vomiting


Pulmonary: Denied any shortness of breath cough


Neurologic denied any new focal deficits





All inpatient medications were reviewed and appropriate changes in these 

medications as dictated in the interval history and assessment and plan.








Objective





- Vital Signs


Vital signs: 


 Vital Signs











Temp  97.9 F   02/10/19 08:00


 


Pulse  80   02/10/19 08:00


 


Resp  18   02/10/19 08:00


 


BP  109/67   02/10/19 08:00


 


Pulse Ox  96   02/10/19 08:00








 Intake & Output











 02/09/19 02/10/19 02/10/19





 18:59 06:59 18:59


 


Intake Total 720 137 


 


Output Total 800  


 


Balance -80 137 


 


Weight  41.6 kg 


 


Intake:   


 


  Oral 720 137 


 


Output:   


 


  Urine 800  


 


Other:   


 


  Voiding Method  Toilet 


 


  # Voids 3 2 2














- Exam





PHYSICAL EXAMINATION: 





GENERAL: The patient is alert and oriented x3, not in any acute distress.  Thin 

built female


HEENT: Pupils are round and equally reacting to light. EOMI. No scleral 

icterus. No conjunctival pallor. Normocephalic, atraumatic. No pharyngeal 

erythema. No thyromegaly. 


CARDIOVASCULAR: S1 and S2 present. No murmurs, rubs, or gallops. 


PULMONARY: Chest is clear to auscultation, no wheezing or crackles. 


ABDOMEN: Soft, nontender, nondistended, normoactive bowel sounds. No palpable 

organomegaly. 


MUSCULOSKELETAL: No joint swelling or deformity.


EXTREMITIES: No cyanosis, clubbing, or pedal edema. 


NEUROLOGICAL: Gross neurological examination did not reveal any focal deficits. 


SKIN: No rashes. 








- Labs


CBC & Chem 7: 


 02/07/19 12:35





 02/07/19 12:35





Assessment and Plan


Plan: 


-Spontaneous pneumothorax probably secondary to ruptured bullae and underlying 

COPD and emphysema: Patient had a thorough vent that was placed with the 

significant expansion discernment will be connected to vacuum suction.  Sterile 

connected to wall suction this will be discussed in your treatment and repeat 

chest x-ray tomorrow if there is no pneumothorax patient can be discharged at 

the time


-Nicotine abuse with possibility of undiagnosed COPD not in acute exacerbation


-Fibromyalgia


-Depression and anxiety disorder


-Hypothyroidism


-Peripheral neuropathy etiology is unknown.

## 2019-02-10 NOTE — P.PN
Subjective


Progress Note Date: 02/10/19








58-year-old here patient, a chronic smoker presented to [facility] because of 

chest pain and shortness of breath.  She was found to have a spontaneous 

pneumothorax on the right side identified by chest x-ray that was done based on 

the chest x-ray that was done in the emergency department.  The right-sided 

pneumothorax was estimated to be around 50%.  No hemodynamic instability.  The 

Thoravent was inserted.  There was partial expansion and the patient was 

admitted to the medical floor.  Currently she is hemodynamically stable.  She 

is free of any chest pain.  Shortness of breath has subsided.  I saw the 

patient a medical floor.  I connected the thoravent to pleuroVAC and wall 

suction.  No previous history of pneumothoraces.  No trauma.  She is a chronic 

smoker in she probably has underlying COPD.





On 02/08/2019 patient seen in follow-up. right-sided Thora-Vent, today's chest x

-ray showed improved degree of the right-sided pneumothorax and was estimated 

at 10% with the new right axillary subcutaneous edema.  No chest pain, no 

worsening dyspnea, patient is working on incentive spirometer, vital signs are 

stable, patient's Thoravent was capped with a one-way valve, follow-up chest x-

ray showed increase in the right-sided pneumothorax by 15-20%. Thoravent was 

placed back to suction. Repeat chest x-ray in the morning.breath sounds heard 

over right upper and lower lobes, patient is maintaining good oxygenation, room 

air pulse ox is 93%.  Hemodynamically patient stable, bubbling was noted in the 

water seal chamber after placement to suction, and stopped after a couple 

minutes.





Patient is seen today 02/09/2018 in follow-up on the selective care unit.  She 

remains awake and alert in no acute distress.  Right-sided Thora vent remains 

in place into wall suction.  Today's chest x-ray shows resolution of the 

spontaneous pneumothorax.  Yesterday we trialed her off the suction and her 

pneumothorax returned at 15-20%.  We'll give her another trial tomorrow.  He 

denies any worsening shortness of breath, cough or congestion.  She is 

maintaining good O2 saturations in the 90s on room air.  She's been afebrile.  

Hemodynamically stable.








On 02/10/2019, the patient is awake and alert.  No respiratory distress.  

Limited advanced emphysema over the right anterior chest area.  Chest x-ray 

showed adequate expansion of the right lung.  No significant air leaks in the 

Pleur-evac.  No shortness of breath.  No cough or sputum production.  No other 

complaints otherwise for now.  The chest x-ray showed no pneumothorax.  Chest 

tube is in a good location.





Objective





- Vital Signs


Vital signs: 


 Vital Signs











Temp  98.2 F   02/10/19 11:22


 


Pulse  63   02/10/19 11:22


 


Resp  16   02/10/19 11:22


 


BP  111/66   02/10/19 11:22


 


Pulse Ox  95   02/10/19 11:22








 Intake & Output











 02/09/19 02/10/19 02/10/19





 18:59 06:59 18:59


 


Intake Total 720 137 180


 


Output Total 800  950


 


Balance -80 137 -770


 


Weight  41.6 kg 


 


Intake:   


 


  Oral 720 137 180


 


Output:   


 


  Urine 800  950


 


Other:   


 


  Voiding Method  Toilet 


 


  # Voids 3 2 2














- Exam








GENERAL EXAM: Alert, active, comfortable in no apparent distress.  On room air.


HEAD: Normocephalic.


EYES: Normal reaction of pupils, equal size.


NOSE: Clear with pink turbinates.


THROAT: No erythema or exudates.


NECK: No masses, no JVD.


CHEST: No chest wall deformity.


LUNGS: Equal air entry with no crackles, wheeze, rhonchi or dullness.  The 

patient has a thora vent over the right anterior chest area.


CVS: S1 and S2 normal with no audible murmur, regular rhythm.


ABDOMEN: No hepatosplenomegaly, normal bowel sounds, no guarding or rigidity.


SPINE: No scoliosis or deformity


SKIN: No rashes


CENTRAL NERVOUS SYSTEM: No focal deficits, tone is normal in all 4 extremities.


EXTREMITIES: There is no peripheral edema.  No clubbing, no cyanosis.  

Peripheral pulses are intact.





- Labs


CBC & Chem 7: 


 02/07/19 12:35





 02/07/19 12:35





Assessment and Plan


Plan: 








Assessment





1 spontaneous pneumothorax, likely secondary to underlying COPD.  The patient 

to 50% pneumothorax on the right and the patient had a Thoravent inserted and 

the lung is fully expanded this point in time without any significant air leaks.





2 smoker





3 chronic insomnia





4 fibromyalgia





5 chronic anxiety





6 depression





7 borderline percent disorder





8 hypothyroidism





9 peripheral neuropathy





Plan





Daily chest x-rays.  Incentive spirometer.  Monitor and keep the Thoravent to a 

Pleur-evac.  Repeat chest x-ray in the morning.  If the lung is still expanded 

and there is no evidence of air leak, will cap the chest tube and consider 

removing admitted later stage.  We'll continue to follow.

## 2019-02-11 RX ADMIN — FAMOTIDINE SCH MG: 20 TABLET, FILM COATED ORAL at 20:33

## 2019-02-11 RX ADMIN — VORTIOXETINE SCH MG: 10 TABLET, FILM COATED ORAL at 08:30

## 2019-02-11 RX ADMIN — KETOROLAC TROMETHAMINE PRN MG: 30 INJECTION, SOLUTION INTRAMUSCULAR at 22:11

## 2019-02-11 RX ADMIN — FAMOTIDINE SCH MG: 20 TABLET, FILM COATED ORAL at 08:27

## 2019-02-11 RX ADMIN — THERA TABS SCH EACH: TAB at 08:27

## 2019-02-11 RX ADMIN — CEFAZOLIN SCH: 330 INJECTION, POWDER, FOR SOLUTION INTRAMUSCULAR; INTRAVENOUS at 20:25

## 2019-02-11 RX ADMIN — KETOROLAC TROMETHAMINE PRN MG: 30 INJECTION, SOLUTION INTRAMUSCULAR at 08:28

## 2019-02-11 RX ADMIN — GABAPENTIN SCH MG: 100 CAPSULE ORAL at 17:27

## 2019-02-11 RX ADMIN — NICOTINE SCH PATCH: 14 PATCH, EXTENDED RELEASE TRANSDERMAL at 08:27

## 2019-02-11 RX ADMIN — GABAPENTIN SCH MG: 100 CAPSULE ORAL at 08:27

## 2019-02-11 RX ADMIN — LEVOTHYROXINE SODIUM SCH MCG: 88 TABLET ORAL at 05:54

## 2019-02-11 RX ADMIN — GABAPENTIN SCH MG: 300 CAPSULE ORAL at 20:33

## 2019-02-11 RX ADMIN — HYDROMORPHONE HYDROCHLORIDE PRN MG: 1 INJECTION, SOLUTION INTRAMUSCULAR; INTRAVENOUS; SUBCUTANEOUS at 17:30

## 2019-02-11 NOTE — XR
EXAMINATION TYPE: XR chest 2V

 

DATE OF EXAM: 2/11/2019

 

COMPARISON: 2/20/2019 earlier exam

 

INDICATION: Follow-up pneumothorax

 

TECHNIQUE:  Frontal and lateral views of the chest are obtained.

 

FINDINGS:  

The heart size is normal.  

The pulmonary vasculature is normal.

There is hyperinflation present. Right-sided chest tube is present. No pneumothorax is evident. Small
 amount subcutaneous emphysema is present on the right.

 

IMPRESSION:  

1. No pneumothorax. Right-sided chest tube remains in position.

## 2019-02-11 NOTE — XR
EXAMINATION TYPE: XR chest 2V

 

DATE OF EXAM: 2/11/2019

 

COMPARISON: 2/10/2019

 

INDICATION: Spontaneous pneumothorax

 

TECHNIQUE:  Frontal and lateral views of the chest are obtained.

 

FINDINGS:  

The heart size is normal.  

The pulmonary vasculature is normal.

The lungs are clear.  Very tiny right apical pneumothorax is present. The subcutaneous emphysema is d
iminishing. Right-sided chest tube remains in position.

 

IMPRESSION:  

1. Very tiny right apical pneumothorax slightly more visible currently.

## 2019-02-11 NOTE — P.PN
Subjective


Progress Note Date: 02/11/19


Principal diagnosis: 


 spontaneous pneumothorax, right side, status post Thoravent insertion





58-year-old here patient, a chronic smoker presented to [facility] because of 

chest pain and shortness of breath.  She was found to have a spontaneous 

pneumothorax on the right side identified by chest x-ray that was done based on 

the chest x-ray that was done in the emergency department.  The right-sided 

pneumothorax was estimated to be around 50%.  No hemodynamic instability.  The 

Thoravent was inserted.  There was partial expansion and the patient was 

admitted to the medical floor.  Currently she is hemodynamically stable.  She 

is free of any chest pain.  Shortness of breath has subsided.  I saw the 

patient a medical floor.  I connected the thoravent to pleuroVAC and wall 

suction.  No previous history of pneumothoraces.  No trauma.  She is a chronic 

smoker in she probably has underlying COPD.





On 02/08/2019 patient seen in follow-up. right-sided Thora-Vent, today's chest x

-ray showed improved degree of the right-sided pneumothorax and was estimated 

at 10% with the new right axillary subcutaneous edema.  No chest pain, no 

worsening dyspnea, patient is working on incentive spirometer, vital signs are 

stable, patient's Thoravent was capped with a one-way valve, follow-up chest x-

ray showed increase in the right-sided pneumothorax by 15-20%. Thoravent was 

placed back to suction. Repeat chest x-ray in the morning.breath sounds heard 

over right upper and lower lobes, patient is maintaining good oxygenation, room 

air pulse ox is 93%.  Hemodynamically patient stable, bubbling was noted in the 

water seal chamber after placement to suction, and stopped after a couple 

minutes. 





On 02/11/2019 patient seen in follow-up on selective care unit.  His, 

comfortable, in no acute distress, she is working on her incentive spirometer, 

no dyspnea, no chest pain, room air pulse ox is 96%, afebrile, hemodynamically 

stable.  Right sided  thoravent is in place, to Pleur-evac to water seal.  Today

's chest x-ray showed a very tiny right apical pneumothorax.  No bubbling in 

the waterseal chamber.  Patient's Thora vent has been capped.  We'll obtain a 

follow-up chest x-ray in 1 hour.  No new labs today.








Objective





- Vital Signs


Vital signs: 


 Vital Signs











Temp  97.7 F   02/11/19 13:05


 


Pulse  65   02/11/19 13:05


 


Resp  16   02/11/19 13:05


 


BP  124/74   02/11/19 13:05


 


Pulse Ox  95   02/11/19 13:05








 Intake & Output











 02/10/19 02/11/19 02/11/19





 18:59 06:59 18:59


 


Intake Total 180 402 480


 


Output Total 950  


 


Balance -770 402 480


 


Weight  40.5 kg 


 


Intake:   


 


  Oral 180 402 480


 


Output:   


 


  Urine 950  


 


Other:   


 


  Voiding Method  Toilet Toilet


 


  # Voids 1 1 1














- Exam


 GENERAL EXAM: Alert, pleasant, 58-year-old white female, comfortable in no 

apparent distress.


HEAD: Normocephalic/atraumatic.


EYES: Normal reaction of pupils, equal size.  Conjunctiva pink, sclera white.


NOSE: Clear with pink turbinates.


THROAT: No erythema or exudates.


NECK: No masses, no JVD, no thyroid enlargement, no adenopathy.


CHEST: No chest wall deformity.  right upper chest Thoravent present, to 

Pleuravac, to waterseal, no air leak noted


LUNGS: Equal air entry with no crackles, wheeze, rhonchi or dullness.


CVS: Regular rate and rhythm, normal S1 and S2, no gallops, no murmurs, no rubs


ABDOMEN: Soft, nontender.  No hepatosplenomegaly, normal bowel sounds, no 

guarding or rigidity.


EXTREMITIES: No clubbing, no edema, no cyanosis, 2+ pulses and upper and lower 

extremities.


MUSCULOSKELETAL: Muscle strength and tone normal.


SPINE: No scoliosis or deformity


SKIN: No rashes


CENTRAL NERVOUS SYSTEM: Alert and oriented -3.  No focal deficits, tone is 

normal in all 4 extremities.


PSYCHIATRIC: Alert and oriented -3.  Appropriate affect.  Intact judgment and 

insight.











- Labs


CBC & Chem 7: 


 02/07/19 12:35





 02/07/19 12:35





Assessment and Plan


Plan: 


1 spontaneous pneumothorax, likely secondary to underlying COPD.  The patient 

to 50% pneumothorax on the right and the patient had a Thoravent inserted with 

reexpansion





2 smoker





3 chronic insomnia





4 fibromyalgia





5 chronic anxiety





6 depression





7 borderline percent disorder





8 hypothyroidism





9 peripheral neuropathy





Plan:





Right-sided Thoravent to waterseal overnight, today's chest x-ray has been 

reviewed, and shows a very tiny right apical pneumothorax, no air leak noted.  

Patient is calm and comfortable, oxygenating well, she is on room air, she is 

working on her incentive spirometer.  We'll cap the thoravent, and obtain a 

follow up chest x-ray in 1 hour, if the lung stays reexpanded, likely 

discontinue the Thoravent and possibly discharge home


I performed a history & physical examination of the patient and discussed their 

management with my nurse practitioner, Lori Bergeron.  I reviewed the nurse 

practitioner's note and agree with the documented findings and plan of care.  

Lung sounds are positive for clear breath sounds.  The findings and the 

impression was discussed with the patient.  I attest to the documentation by 

the nurse practitioner. 








Time with Patient: Less than 30

## 2019-02-12 VITALS — RESPIRATION RATE: 16 BRPM | TEMPERATURE: 98.1 F

## 2019-02-12 VITALS — SYSTOLIC BLOOD PRESSURE: 108 MMHG | DIASTOLIC BLOOD PRESSURE: 55 MMHG | HEART RATE: 67 BPM

## 2019-02-12 RX ADMIN — NICOTINE SCH PATCH: 14 PATCH, EXTENDED RELEASE TRANSDERMAL at 08:52

## 2019-02-12 RX ADMIN — VORTIOXETINE SCH MG: 10 TABLET, FILM COATED ORAL at 08:56

## 2019-02-12 RX ADMIN — FAMOTIDINE SCH MG: 20 TABLET, FILM COATED ORAL at 08:52

## 2019-02-12 RX ADMIN — LEVOTHYROXINE SODIUM SCH MCG: 88 TABLET ORAL at 05:41

## 2019-02-12 RX ADMIN — GABAPENTIN SCH MG: 100 CAPSULE ORAL at 08:53

## 2019-02-12 RX ADMIN — THERA TABS SCH EACH: TAB at 08:53

## 2019-02-12 NOTE — P.PN
Subjective


Progress Note Date: 02/11/19


58-year-old the female admitted for spottiness pneumothorax in the right side 

repeat chest x-ray showed resolving pneumothorax.  Patient is presently 

contacted to suction and has a prominent in place patient has mild subcutaneous 

emphysema as well as asked x-ray as well as clinically mild crepitus patient is 

comparing of pain in the right shoulder area.





02/09/2019


No overnight events patient is feeling well and Pneumovax continues to improve 

patient the still connected to suction





02/10/2019


Patient continues to be connected to wall suction, probably this was suction 

can be discontinued and monitor her today and if she does well she can be 

discharged tomorrow





02/11/2019


Patient chest tube is connected to underwater seal probably this can be removed 

and monitored overnight and patient probably can be discharged tomorrow





Constitutional: Denied any fatigue denied any fever.


Cardio vascular: denied any chest pain, palpitations


Gastrointestinal denied any nausea vomiting


Pulmonary: Denied any shortness of breath cough


Neurologic denied any new focal deficits





All inpatient medications were reviewed and appropriate changes in these 

medications as dictated in the interval history and assessment and plan.








Objective





- Vital Signs


Vital signs: 


 Vital Signs











Temp  98.1 F   02/12/19 04:00


 


Pulse  67   02/12/19 08:00


 


Resp  16   02/12/19 11:19


 


BP  108/55   02/12/19 08:00


 


Pulse Ox  95   02/12/19 08:00








 Intake & Output











 02/11/19 02/12/19 02/12/19





 18:59 06:59 18:59


 


Intake Total 580 200 240


 


Output Total   500


 


Balance 580 200 -260


 


Weight  44 kg 


 


Intake:   


 


  Oral 580 200 240


 


Output:   


 


  Urine   500


 


Other:   


 


  Voiding Method Toilet Toilet Toilet


 


  # Voids 3 1 1














- Exam





PHYSICAL EXAMINATION: 





GENERAL: The patient is alert and oriented x3, not in any acute distress.  Thin 

built female


HEENT: Pupils are round and equally reacting to light. EOMI. No scleral 

icterus. No conjunctival pallor. Normocephalic, atraumatic. No pharyngeal 

erythema. No thyromegaly. 


CARDIOVASCULAR: S1 and S2 present. No murmurs, rubs, or gallops. 


PULMONARY: Chest is clear to auscultation, no wheezing or crackles. 


ABDOMEN: Soft, nontender, nondistended, normoactive bowel sounds. No palpable 

organomegaly. 


MUSCULOSKELETAL: No joint swelling or deformity.


EXTREMITIES: No cyanosis, clubbing, or pedal edema. 


NEUROLOGICAL: Gross neurological examination did not reveal any focal deficits. 


SKIN: No rashes. 








- Labs


CBC & Chem 7: 


 02/07/19 12:35





 02/07/19 12:35





Assessment and Plan


Plan: 


-Spontaneous pneumothorax probably secondary to ruptured bullae and underlying 

COPD and emphysema: Patient had a Thoravent that was placed with the 

significant expansion now connected to underwater seal without any air leak 

probably chest tube can come out and monitor 1 more night possibility of 

discharge tomorrow


-Nicotine abuse with possibility of undiagnosed COPD not in acute exacerbation


-Fibromyalgia


-Depression and anxiety disorder


-Hypothyroidism


-Peripheral neuropathy etiology is unknown.

## 2019-02-12 NOTE — P.PN
Subjective


Progress Note Date: 02/12/19


Principal diagnosis: 


 spontaneous pneumothorax, right side, status post Thoravent insertion





58-year-old here patient, a chronic smoker presented to [facility] because of 

chest pain and shortness of breath.  She was found to have a spontaneous 

pneumothorax on the right side identified by chest x-ray that was done based on 

the chest x-ray that was done in the emergency department.  The right-sided 

pneumothorax was estimated to be around 50%.  No hemodynamic instability.  The 

Thoravent was inserted.  There was partial expansion and the patient was 

admitted to the medical floor.  Currently she is hemodynamically stable.  She 

is free of any chest pain.  Shortness of breath has subsided.  I saw the 

patient a medical floor.  I connected the thoravent to pleuroVAC and wall 

suction.  No previous history of pneumothoraces.  No trauma.  She is a chronic 

smoker in she probably has underlying COPD.





On 02/08/2019 patient seen in follow-up. right-sided Thora-Vent, today's chest x

-ray showed improved degree of the right-sided pneumothorax and was estimated 

at 10% with the new right axillary subcutaneous edema.  No chest pain, no 

worsening dyspnea, patient is working on incentive spirometer, vital signs are 

stable, patient's Thoravent was capped with a one-way valve, follow-up chest x-

ray showed increase in the right-sided pneumothorax by 15-20%. Thoravent was 

placed back to suction. Repeat chest x-ray in the morning.breath sounds heard 

over right upper and lower lobes, patient is maintaining good oxygenation, room 

air pulse ox is 93%.  Hemodynamically patient stable, bubbling was noted in the 

water seal chamber after placement to suction, and stopped after a couple 

minutes. 





On 02/11/2019 patient seen in follow-up on selective care unit.  His, 

comfortable, in no acute distress, she is working on her incentive spirometer, 

no dyspnea, no chest pain, room air pulse ox is 96%, afebrile, hemodynamically 

stable.  Right sided  thoravent is in place, to Pleur-evac to water seal.  Today

's chest x-ray showed a very tiny right apical pneumothorax.  No bubbling in 

the waterseal chamber.  Patient's Thora vent has been capped.  We'll obtain a 

follow-up chest x-ray in 1 hour.  No new labs today.





On 02/12/2019 patient seen in follow-up selective care unit, repeat chest x-ray 

today showed evidence of appreciable pneumothorax, right-sided thoravent has 

been capped since yesterday.  Vital signs are stable, breath sounds are equal, 

difficulty breathing or patient is on room air, she's been tolerating ambulation

, oxygen 95%, afebrile.  Right-sided Thoravent was discontinued, occlusive 

dressing with vaseline impregnated gauze applied.  Patient is doing well, no 

difficulty breathing, no chest pain.  From pulmonary perspective She can be 

discharged home today








Objective





- Vital Signs


Vital signs: 


 Vital Signs











Temp  98.1 F   02/12/19 04:00


 


Pulse  67   02/12/19 08:00


 


Resp  16   02/12/19 11:19


 


BP  108/55   02/12/19 08:00


 


Pulse Ox  95   02/12/19 08:00








 Intake & Output











 02/11/19 02/12/19 02/12/19





 18:59 06:59 18:59


 


Intake Total 580 200 240


 


Output Total   500


 


Balance 580 200 -260


 


Weight  44 kg 


 


Intake:   


 


  Oral 580 200 240


 


Output:   


 


  Urine   500


 


Other:   


 


  Voiding Method Toilet Toilet Toilet


 


  # Voids 3 1 1














- Exam


 GENERAL EXAM: Alert, pleasant, 58-year-old white female, comfortable in no 

apparent distress.


HEAD: Normocephalic/atraumatic.


EYES: Normal reaction of pupils, equal size.  Conjunctiva pink, sclera white.


NOSE: Clear with pink turbinates.


THROAT: No erythema or exudates.


NECK: No masses, no JVD, no thyroid enlargement, no adenopathy.


CHEST: No chest wall deformity.  right upper chest Thoravent discontnued


LUNGS: Equal air entry with no crackles, wheeze, rhonchi or dullness.


CVS: Regular rate and rhythm, normal S1 and S2, no gallops, no murmurs, no rubs


ABDOMEN: Soft, nontender.  No hepatosplenomegaly, normal bowel sounds, no 

guarding or rigidity.


EXTREMITIES: No clubbing, no edema, no cyanosis, 2+ pulses and upper and lower 

extremities.


MUSCULOSKELETAL: Muscle strength and tone normal.


SPINE: No scoliosis or deformity


SKIN: No rashes


CENTRAL NERVOUS SYSTEM: Alert and oriented -3.  No focal deficits, tone is 

normal in all 4 extremities.


PSYCHIATRIC: Alert and oriented -3.  Appropriate affect.  Intact judgment and 

insight.











- Labs


CBC & Chem 7: 


 02/07/19 12:35





 02/07/19 12:35





Assessment and Plan


Plan: 


1 spontaneous pneumothorax, likely secondary to underlying COPD.  The patient 

to 50% pneumothorax on the right and the patient had a Thoravent inserted with 

reexpansion





2 smoker





3 chronic insomnia





4 fibromyalgia





5 chronic anxiety





6 depression





7 borderline percent disorder





8 hypothyroidism





9 peripheral neuropathy





Plan:





Thoravent has been discontinued this morning, it had been capped since yesterday

, this morning's chest x-ray showed no pneumothorax, occlusive dressing was 

applied.  Equal breath sounds bilaterally, maintaining stable oxygenation on 

room air.  Awaiting ablation.  From pulmonary perspective patient is stable for 

discharge home today, follow up with Dr. Juan in the office in one week.  

Nicotine cessation counseling was done.  





I performed a history & physical examination of the patient and discussed their 

management with my nurse practitioner, Lori Bergeron.  I reviewed the nurse 

practitioner's note and agree with the documented findings and plan of care.  

Lung sounds are positive for clear breath sounds.  The findings and the 

impression was discussed with the patient.  I attest to the documentation by 

the nurse practitioner. 








Time with Patient: Less than 30

## 2019-02-12 NOTE — P.DS
Providers


Date of admission: 


02/07/19 15:15





Attending physician: 


Cathie Agee





Consults: 





 





02/07/19 15:17


Consult Physician Urgent 


   Consulting Provider: Paul Juan


   Consult Reason/Comments: Pneumothorax


   Do you want consulting provider notified?: Already Contacted











Primary care physician: 


[facility] Course: 





58-year-old the female admitted for spottiness pneumothorax in the right side 

repeat chest x-ray showed resolving pneumothorax.  Patient is presently 

contacted to suction and has a prominent in place patient has mild subcutaneous 

emphysema as well as asked x-ray as well as clinically mild crepitus patient is 

comparing of pain in the right shoulder area.





02/09/2019


No overnight events patient is feeling well and Pneumovax continues to improve 

patient the still connected to suction





02/10/2019


Patient continues to be connected to wall suction, probably this was suction 

can be discontinued and monitor her today and if she does well she can be 

discharged tomorrow





02/11/2019


Patient chest tube is connected to underwater seal probably this can be removed 

and monitored overnight and patient probably can be discharged tomorrow





02/12/2019


Patient chest tube was removed and patient is clinically doing well and will be 

discharged today











PHYSICAL EXAMINATION: 





GENERAL: The patient is alert and oriented x3, not in any acute distress.  Thin 

built female


HEENT: Pupils are round and equally reacting to light. EOMI. No scleral 

icterus. No conjunctival pallor. Normocephalic, atraumatic. No pharyngeal 

erythema. No thyromegaly. 


CARDIOVASCULAR: S1 and S2 present. No murmurs, rubs, or gallops. 


PULMONARY: Chest is clear to auscultation, no wheezing or crackles. 


ABDOMEN: Soft, nontender, nondistended, normoactive bowel sounds. No palpable 

organomegaly. 


MUSCULOSKELETAL: No joint swelling or deformity.


EXTREMITIES: No cyanosis, clubbing, or pedal edema. 


NEUROLOGICAL: Gross neurological examination did not reveal any focal deficits. 


SKIN: No rashes. 





Assessment and Plan


Plan: 


-Spontaneous pneumothorax probably secondary to ruptured bullae and underlying 

COPD and emphysema


-Nicotine abuse with possibility of undiagnosed COPD not in acute exacerbation


-Fibromyalgia


-Depression and anxiety disorder


-Hypothyroidism


-Peripheral neuropathy etiology is unknown.











Patient Condition at Discharge: Stable





Plan - Discharge Summary


Discharge Rx Participant: Yes


New Discharge Prescriptions: 


New


   Albuterol Inhaler [Ventolin Hfa Inhaler] 1 - 2 puff INHALATION Q6HR PRN #1 

inhaler


     PRN Reason: Shortness Of Breath Or Wheezing


   Budesonide-Formot 160-4.5 Mcg [Symbicort 160-4.5 Mcg Inhaler] 2 puff 

INHALATION BID #1 inhaler





Continue


   traZODone  mg PO HS


   Levothyroxine Sodium [Synthroid] 88 mcg PO DAILY


   Multivitamins, Thera [Multivitamin (formulary)] 1 tab PO DAILY


   Gabapentin [Neurontin] 300 mg PO HS


   ARIPiprazole [Abilify] 10 mg PO HS


   hydrOXYzine PAMOATE [Vistaril] 25 mg PO BID PRN


     PRN Reason: Anxiety


   Vortioxetine Hydrobromide [Trintellix] 10 mg PO DAILY


   Gabapentin [Neurontin] 100 mg PO BID


   busPIRone HCL 15 mg PO BID


   ARIPiprazole [Abilify] 5 mg PO DAILY


Discharge Medication List





Levothyroxine Sodium [Synthroid] 88 mcg PO DAILY 10/25/18 [History]


traZODone  mg PO HS 10/25/18 [History]


ARIPiprazole [Abilify] 5 mg PO DAILY 02/04/19 [History]


ARIPiprazole [Abilify] 10 mg PO HS 02/04/19 [History]


Gabapentin [Neurontin] 100 mg PO BID 02/04/19 [History]


Gabapentin [Neurontin] 300 mg PO HS 02/04/19 [History]


Multivitamins, Thera [Multivitamin (formulary)] 1 tab PO DAILY 02/04/19 [History

]


Vortioxetine Hydrobromide [Trintellix] 10 mg PO DAILY 02/04/19 [History]


busPIRone HCL 15 mg PO BID 02/04/19 [History]


hydrOXYzine PAMOATE [Vistaril] 25 mg PO BID PRN 02/04/19 [History]


Albuterol Inhaler [Ventolin Hfa Inhaler] 1 - 2 puff INHALATION Q6HR PRN #1 

inhaler 02/12/19 [Rx]


Budesonide-Formot 160-4.5 Mcg [Symbicort 160-4.5 Mcg Inhaler] 2 puff INHALATION 

BID #1 inhaler 02/12/19 [Rx]








Follow up Appointment(s)/Referral(s): 


[facility], [NON-STAFF] - 


Inge Harris MD [Primary Care Provider] - 02/15/19 10:45 am (Friday)


Paul Juan MD [STAFF PHYSICIAN] - 03/07/19 1:00 pm


Patient Instructions/Handouts:  Spontaneous Pneumothorax (DC), How to Stop 

Smoking (DC)


Discharge Disposition: HOME SELF-CARE

## 2019-02-12 NOTE — XR
EXAMINATION TYPE: XR chest 2V

 

DATE OF EXAM: 2/12/2019

 

COMPARISON: 2/11/2019

 

HISTORY: 58-year-old female follow-up spontaneous pneumothorax

 

TECHNIQUE:  PA and lateral views

 

FINDINGS:  

Right-sided Thora Vent catheter anteriorly at the second intercostal space remains in place. No appre
ciable pneumothorax. Heart normal size. Mild hyperinflation. No consolidation or pleural effusion. Mi
ld subcutaneous emphysema along the right lateral chest wall.

 

 

IMPRESSION:  

 

1. Right-sided Thora Vent catheter without appreciable pneumothorax.

2. Hyperinflation may relate to depth of inspiration or underlying emphysema.

## 2019-02-14 NOTE — CDI
Documentation Clarification Form



Date: 2/14/2019 7:17:00 AM

From: Dori Briceno

Phone: [phone] Seda Ledbetter,  Hours-8:30 am & 5 pm [email or web address]

MRN: [id number]

Admit Date: 2/7/2019 3:15:00 PM

Patient Name: Essie Naidu

Visit Number: [id number]

Discharge Date:  2/12/2019 1:06:00 PM



ATTENTION: The Clinical Documentation Specialists (CDI) and [facility] Coding Staff 
appreciate your assistance in clarifying documentation. Please respond to the 
clarification below the line at the bottom and electronically sign. The CDI & 
[facility] Coding staff will review the response and follow-up if needed. Please note: 
Queries are made part of the Legal Health Record. If you have any questions, 
please contact the author of this message via ITS.



Dr. Cathie Agee



The patient presented with the following pneumothorax secondary to COPD/
Emphysema.



Patient developed pneumothorax and chest tube was placed in ER. Subsequent 
chest x-ray revealed pneumothorax improving but now with a new 10 % right 
axillary subcutaneous emphysema. The patient was monitored with a total of 9 CXR
's during her visit. 



In your professional opinion, can you please clarify the etiology of the 
subcutaneous emphysema? 



Nontraumatic subcutaneous emphysema

Postprocedural subcutaneous emphysema

Surgical subcutaneous emphysema

Other/unspecified   

Other, please specify ________

Unable to determine

___________________________________________________________________________

MTDD

## 2019-02-19 ENCOUNTER — HOSPITAL ENCOUNTER (OUTPATIENT)
Dept: HOSPITAL 47 - LABWHC1 | Age: 59
End: 2019-02-19
Payer: MEDICARE

## 2019-02-19 DIAGNOSIS — N39.0: ICD-10-CM

## 2019-02-19 DIAGNOSIS — D64.9: Primary | ICD-10-CM

## 2019-02-19 DIAGNOSIS — E83.39: ICD-10-CM

## 2019-02-19 LAB
ALBUMIN SERPL-MCNC: 4.5 G/DL (ref 3.8–4.9)
ALBUMIN/GLOB SERPL: 2.37 G/DL (ref 1.6–3.17)
ALP SERPL-CCNC: 48 U/L (ref 41–126)
ALT SERPL-CCNC: 19 U/L (ref 8–44)
ANION GAP SERPL CALC-SCNC: 5.9 MMOL/L (ref 4–12)
AST SERPL-CCNC: 26 U/L (ref 13–35)
BUN SERPL-SCNC: 12 MG/DL (ref 9–27)
CALCIUM SPEC-MCNC: 9.3 MG/DL (ref 8.7–10.3)
CHLORIDE SERPL-SCNC: 102 MMOL/L (ref 96–109)
CO2 SERPL-SCNC: 31.1 MMOL/L (ref 21.6–31.8)
ERYTHROCYTE [DISTWIDTH] IN BLOOD BY AUTOMATED COUNT: 4.45 M/UL (ref 3.8–5.4)
ERYTHROCYTE [DISTWIDTH] IN BLOOD: 12.1 % (ref 11.5–15.5)
GLOBULIN SER CALC-MCNC: 1.9 G/DL (ref 1.6–3.3)
GLUCOSE SERPL-MCNC: 94 MG/DL (ref 70–110)
HCT VFR BLD AUTO: 44.7 % (ref 34–46)
HGB BLD-MCNC: 14.3 GM/DL (ref 11.4–16)
MCH RBC QN AUTO: 32 PG (ref 25–35)
MCHC RBC AUTO-ENTMCNC: 31.9 G/DL (ref 31–37)
MCV RBC AUTO: 100.4 FL (ref 80–100)
PH UR: 6.5 [PH] (ref 5–8)
PLATELET # BLD AUTO: 250 K/UL (ref 150–450)
POTASSIUM SERPL-SCNC: 4.8 MMOL/L (ref 3.5–5.5)
PROT SERPL-MCNC: 6.4 G/DL (ref 6.2–8.2)
RBC UR QL: 9 /HPF (ref 0–5)
SODIUM SERPL-SCNC: 139 MMOL/L (ref 135–145)
SP GR UR: 1.01 (ref 1–1.03)
SQUAMOUS UR QL AUTO: <1 /HPF (ref 0–4)
UROBILINOGEN UR QL STRIP: <2 MG/DL (ref ?–2)
WBC # BLD AUTO: 5.4 K/UL (ref 3.8–10.6)
WBC #/AREA URNS HPF: 1 /HPF (ref 0–5)

## 2019-02-19 PROCEDURE — 81001 URINALYSIS AUTO W/SCOPE: CPT

## 2019-02-19 PROCEDURE — 84100 ASSAY OF PHOSPHORUS: CPT

## 2019-02-19 PROCEDURE — 85027 COMPLETE CBC AUTOMATED: CPT

## 2019-02-19 PROCEDURE — 80053 COMPREHEN METABOLIC PANEL: CPT

## 2019-02-19 PROCEDURE — 36415 COLL VENOUS BLD VENIPUNCTURE: CPT

## 2019-03-11 ENCOUNTER — HOSPITAL ENCOUNTER (OUTPATIENT)
Dept: HOSPITAL 47 - RADUSWWP | Age: 59
End: 2019-03-11
Payer: MEDICARE

## 2019-03-11 DIAGNOSIS — E83.52: Primary | ICD-10-CM

## 2019-03-11 PROCEDURE — 76770 US EXAM ABDO BACK WALL COMP: CPT

## 2019-03-11 NOTE — US
EXAMINATION TYPE: US kidneys/renal and bladder

 

DATE OF EXAM: 3/11/2019

 

COMPARISON: CT urogram August 21, 2018

 

CLINICAL HISTORY: E83.52 Hypercalcemia.

 

EXAM MEASUREMENTS:

 

Right Kidney:  10.0 X 3.3 X 4.3 cm

Left Kidney: 10.2 X 5.5 X 4.8 cm

 

 

 

 

Right Kidney: No hydronephrosis or masses seen  

Left Kidney: No hydronephrosis or masses seen  

Bladder: WNL

**Bilateral Jets seen: Yes

 

 

There is no evidence for hydronephrosis at this point in time.  No nephrolithiasis is seen.  No carley
s are identified.  The urinary bladder is anechoic.  Bilateral ureteral jets are seen.

 

 

 

IMPRESSION:

Previously visualized 3 mm calculus mid pole level left kidney is not clearly seen on ultrasound. Damien
rly unremarkable ultrasound study.

## 2019-03-21 ENCOUNTER — HOSPITAL ENCOUNTER (OUTPATIENT)
Dept: HOSPITAL 47 - LABWHC1 | Age: 59
Discharge: HOME | End: 2019-03-21
Attending: FAMILY MEDICINE
Payer: MEDICARE

## 2019-03-21 DIAGNOSIS — R63.6: Primary | ICD-10-CM

## 2019-03-21 PROCEDURE — 82607 VITAMIN B-12: CPT

## 2019-03-21 PROCEDURE — 36415 COLL VENOUS BLD VENIPUNCTURE: CPT

## 2019-03-21 PROCEDURE — 82947 ASSAY GLUCOSE BLOOD QUANT: CPT
